# Patient Record
Sex: FEMALE | Race: WHITE | NOT HISPANIC OR LATINO | Employment: UNEMPLOYED | URBAN - NONMETROPOLITAN AREA
[De-identification: names, ages, dates, MRNs, and addresses within clinical notes are randomized per-mention and may not be internally consistent; named-entity substitution may affect disease eponyms.]

---

## 2023-07-22 ENCOUNTER — APPOINTMENT (EMERGENCY)
Dept: RADIOLOGY | Facility: HOSPITAL | Age: 38
End: 2023-07-22
Payer: COMMERCIAL

## 2023-07-22 ENCOUNTER — HOSPITAL ENCOUNTER (EMERGENCY)
Facility: HOSPITAL | Age: 38
Discharge: HOME | End: 2023-07-22
Attending: EMERGENCY MEDICINE
Payer: COMMERCIAL

## 2023-07-22 VITALS
DIASTOLIC BLOOD PRESSURE: 98 MMHG | SYSTOLIC BLOOD PRESSURE: 156 MMHG | WEIGHT: 229.4 LBS | TEMPERATURE: 97.4 F | OXYGEN SATURATION: 98 % | HEART RATE: 101 BPM | RESPIRATION RATE: 16 BRPM

## 2023-07-22 DIAGNOSIS — M25.511 ACUTE PAIN OF RIGHT SHOULDER: Primary | ICD-10-CM

## 2023-07-22 DIAGNOSIS — M77.8 TENDINITIS OF SHOULDER, RIGHT: ICD-10-CM

## 2023-07-22 PROCEDURE — 99283 EMERGENCY DEPT VISIT LOW MDM: CPT

## 2023-07-22 PROCEDURE — 99284 EMERGENCY DEPT VISIT MOD MDM: CPT | Performed by: EMERGENCY MEDICINE

## 2023-07-22 PROCEDURE — 73030 X-RAY EXAM OF SHOULDER: CPT

## 2023-07-22 RX ORDER — PREDNISONE 20 MG/1
40 TABLET ORAL DAILY
Qty: 10 TABLET | Refills: 0 | Status: SHIPPED | OUTPATIENT
Start: 2023-07-22 | End: 2023-07-27

## 2023-07-22 RX ORDER — NAPROXEN 500 MG/1
500 TABLET ORAL 2 TIMES DAILY WITH MEALS
Qty: 30 TABLET | Refills: 0 | Status: SHIPPED | OUTPATIENT
Start: 2023-07-22

## 2023-07-22 NOTE — ED PROVIDER NOTES
History  Chief Complaint   Patient presents with   • Arm Pain     Left arm pain been going on for a week now, did not call PCP because he is up in [place]. Took ibuprofen and helps     Patient complains of ongoing right shoulder pain for the past 1 week. Ibuprofen with relief. Worse with movement. No known trauma. No fevers or chills. No neck pain. No weakness or numbness. History provided by:  Patient   used: No    Arm Pain  Location:  Right shoulder pain  Quality:  Achy  Severity:  Mild  Onset quality:  Gradual  Duration:  1 week  Timing:  Constant  Progression:  Unchanged  Chronicity:  New  Context:  Atraumatic right shoulder pain  Relieved by:  Ibuprofen  Worsened by: Movement  Ineffective treatments:  None tried  Associated symptoms: no abdominal pain, no chest pain, no congestion, no cough, no diarrhea, no ear pain, no fever, no headaches, no loss of consciousness, no nausea, no rash, no rhinorrhea, no shortness of breath, no sore throat, no vomiting and no wheezing        None       Past Medical History:   Diagnosis Date   • Depression    • GERD (gastroesophageal reflux disease)    • Migraines    • Paranoia ([address])    • PTSD (post-traumatic stress disorder)        Past Surgical History:   Procedure Laterality Date   • CHOLECYSTECTOMY     • DENTAL SURGERY         History reviewed. No pertinent family history. I have reviewed and agree with the history as documented. E-Cigarette/Vaping   • E-Cigarette Use Never User      E-Cigarette/Vaping Substances     Social History     Tobacco Use   • Smoking status: Never   • Smokeless tobacco: Never   Vaping Use   • Vaping Use: Never used   Substance Use Topics   • Alcohol use: Yes     Comment: ocassional   • Drug use: Yes       Review of Systems   Constitutional: Negative for chills and fever. HENT: Negative for congestion, ear pain, hearing loss, rhinorrhea, sore throat, trouble swallowing and voice change.     Eyes: Negative for pain and discharge. Respiratory: Negative for cough, shortness of breath and wheezing. Cardiovascular: Negative for chest pain and palpitations. Gastrointestinal: Negative for abdominal pain, blood in stool, constipation, diarrhea, nausea and vomiting. Genitourinary: Negative for dysuria, flank pain, frequency and hematuria. Musculoskeletal: Positive for arthralgias. Negative for joint swelling, neck pain and neck stiffness. Skin: Negative for rash and wound. Neurological: Negative for dizziness, seizures, loss of consciousness, syncope, facial asymmetry and headaches. Psychiatric/Behavioral: Negative for hallucinations, self-injury and suicidal ideas. All other systems reviewed and are negative. Physical Exam  Physical Exam  Constitutional:       General: She is not in acute distress. Appearance: Normal appearance. She is not ill-appearing. HENT:      Head: Normocephalic and atraumatic. Right Ear: External ear normal.      Left Ear: External ear normal.      Nose: Nose normal.      Mouth/Throat:      Mouth: Mucous membranes are moist.   Eyes:      Extraocular Movements: Extraocular movements intact. Pupils: Pupils are equal, round, and reactive to light. Cardiovascular:      Rate and Rhythm: Normal rate and regular rhythm. Pulmonary:      Effort: Pulmonary effort is normal. No respiratory distress. Breath sounds: Normal breath sounds. Abdominal:      General: Abdomen is flat. Bowel sounds are normal. There is no distension. Palpations: Abdomen is soft. Tenderness: There is no abdominal tenderness. Musculoskeletal:         General: No swelling or tenderness. Cervical back: Normal range of motion and neck supple. Comments: Right shoulder with full range of motion. No obvious bony deformity. Diffusely tender in the shoulder. Radial pulses 2+. Neurovascular tact distally. Skin:     General: Skin is warm and dry.       Capillary Refill: Capillary refill takes less than 2 seconds. Neurological:      General: No focal deficit present. Mental Status: She is alert and oriented to person, place, and time. Psychiatric:         Mood and Affect: Mood normal.         Behavior: Behavior normal.         Vital Signs  ED Triage Vitals [07/22/23 1015]   Temperature Pulse Respirations Blood Pressure SpO2   (!) 97.4 °F (36.3 °C) 101 16 156/98 98 %      Temp Source Heart Rate Source Patient Position - Orthostatic VS BP Location FiO2 (%)   Temporal Monitor Sitting Left arm --      Pain Score       7           Vitals:    07/22/23 1015   BP: 156/98   Pulse: 101   Patient Position - Orthostatic VS: Sitting         Visual Acuity      ED Medications  Medications - No data to display    Diagnostic Studies  Results Reviewed     None                 XR shoulder 2+ views RIGHT   ED Interpretation by Mil Miller MD (07/22 1039)   No fracture or dislocation. Procedures  Procedures         ED Course                               SBIRT 22yo+    Flowsheet Row Most Recent Value   Initial Alcohol Screen: US AUDIT-C     1. How often do you have a drink containing alcohol? 0 Filed at: 07/22/2023 1019   2. How many drinks containing alcohol do you have on a typical day you are drinking? 0 Filed at: 07/22/2023 1019   3b. FEMALE Any Age, or MALE 65+: How often do you have 4 or more drinks on one occassion? 0 Filed at: 07/22/2023 1019   Audit-C Score 0 Filed at: 07/22/2023 1019   CATY: How many times in the past year have you. .. Used an illegal drug or used a prescription medication for non-medical reasons? Never Filed at: 07/22/2023 1019                    Medical Decision Making  Amount and/or Complexity of Data Reviewed  Radiology: ordered and independent interpretation performed. Decision-making details documented in ED Course.   Discussion of management or test interpretation with external provider(s): Differential diagnosis includes but not limited to shoulder strain, tendinitis, bursitis, AC strain, rotator cuff injury, fracture, dislocation. Disposition  Final diagnoses:   Acute pain of right shoulder   Tendinitis of shoulder, right     Time reflects when diagnosis was documented in both MDM as applicable and the Disposition within this note     Time User Action Codes Description Comment    7/22/2023 10:40 AM Yi Mcnamara Add [W92.097] Acute pain of right shoulder     7/22/2023 10:40 AM La Iglesias Monday Add [M77.8] Tendinitis of shoulder, right       ED Disposition     ED Disposition   Discharge    Condition   Stable    Date/Time   Sat Jul 22, 2023 10:40 AM    Comment   Cuca Tracy discharge to home/self care. Follow-up Information     Follow up With Specialties Details Why Contact Info    Haydee Wright MD Family Medicine Call in 2 days  [facility]  [phone]      Nilson Cm MD Sports Medicine Call in 2 days  [address] [place] [address]  [phone]            Patient's Medications   Discharge Prescriptions    NAPROXEN (NAPROSYN) 500 MG TABLET    Take 1 tablet (500 mg total) by mouth 2 (two) times a day with meals       Start Date: 7/22/2023 End Date: --       Order Dose: 500 mg       Quantity: 30 tablet    Refills: 0    PREDNISONE 20 MG TABLET    Take 2 tablets (40 mg total) by mouth daily for 5 days       Start Date: 7/22/2023 End Date: 7/27/2023       Order Dose: 40 mg       Quantity: 10 tablet    Refills: 0       No discharge procedures on file.     PDMP Review     None          ED Provider  Electronically Signed by           Yi Mcnamara MD  07/22/23 0009

## 2024-03-18 ENCOUNTER — TRANSCRIBE ORDERS (OUTPATIENT)
Dept: CARDIOLOGY CLINIC | Facility: CLINIC | Age: 39
End: 2024-03-18

## 2024-03-18 DIAGNOSIS — R31.21 ASYMPTOMATIC MICROSCOPIC HEMATURIA: Primary | ICD-10-CM

## 2024-04-15 PROBLEM — R31.29 MICROHEMATURIA: Status: ACTIVE | Noted: 2024-04-15

## 2024-04-15 NOTE — PROGRESS NOTES
UROLOGY PROGRESS NOTE         NAME: Cele Adrian  AGE: 39 y.o. SEX: female  : 1985   MRN: [id number]    DATE: 2024  TIME: 10:15 AM    Assessment and Plan     Bladder catheterization    Date/Time: 2024 10:00 AM    Performed by: Hari Allison MD  Authorized by: Hari Allison MD    Comments:      Patient was placed in dorsolithotomy position and prepped and draped in usual sterile fashion.  Pelvic exam revealed no prolapse.  She demonstrated average ability for Kegel's.    She was then prepped and draped in a straight cath with a 12 Vietnamese catheter for post residual of an ounce grossly clear.  Patient tolerated well       Impression:   1. Microhematuria    2. Asymptomatic microscopic hematuria  -     Ambulatory referral to Urology    3. Urinary frequency    4. Urge incontinence    5. Female stress incontinence         Plan: For the microhematuria workup we will set the patient up for a CT stone protocol, and a office cystoscopy.  Will also set up for a culture and cytology.    Frequency urgency and urge incontinence we can give her a trial of Gemtesa 75 mg and we will see her back in about 4 weeks to check her response, reviewed the CT and perform the cystoscopy.  Patient understands and agrees.      Chief Complaint     Chief Complaint   Patient presents with    Microhematuria    Difficulty Urinating     Painful urination     Nocturia     History of Present Illness     HPI: Cele Adrian is a 39 y.o. year old female who presents with evaluation of asymptomatic microhematuria.  Was from a urinalysis on 3/14/2024.  Nitrites were negative no white cells but 3-5 red cells.  There was bacteria in the urine urine culture was no growth.    Patient states he has been having daytime frequency sometimes every 1/2 hour.  Nocturia x 2 sometimes pain with voiding but no dysuria.  Patient states no gross hematuria.  Occasionally has some constipation with this pain with defecation.  She has not had  "problems with recurrent UTIs.  Does not smoke or drink she has a good flow.  She also has some mild stress incontinence as well as urge urge incontinence.          The following portions of the patient's history were reviewed and updated as appropriate: allergies, current medications, past family history, past medical history, past social history, past surgical history and problem list.  Past Medical History:   Diagnosis Date    Anxiety and depression     Asthma     Bipolar disorder, unspecified (HCC)     Cerebral palsy (HCC)     Dyslipidemia     Elevated alkaline phosphatase level     GERD (gastroesophageal reflux disease)     Major depressive disorder     Migraines     Obesity without serious comorbidity     Other allergic rhinitis     Paranoia (HCC)     Prediabetes     PTSD (post-traumatic stress disorder)     Social phobia     Thrombocytosis      Past Surgical History:   Procedure Laterality Date    CHOLECYSTECTOMY      DENTAL SURGERY       shoulder  Review of Systems     Const: Denies chills, fever and weight loss.  CV: Denies chest pain.  Resp: Denies SOB.  GI: Denies abdominal pain, nausea and vomiting.  : Denies symptoms other than stated above.  Musculo: Denies back pain.    Objective   /90 (BP Location: Left arm, Patient Position: Sitting, Cuff Size: Adult)   Temp 98.1 °F (36.7 °C)   Ht 5' 1\" (1.549 m)   Wt 104 kg (229 lb 6.4 oz)   SpO2 99%   BMI 43.34 kg/m²     Physical Exam  Const: Appears healthy and well developed. No signs of acute distress present.  Resp: Respirations are regular and unlabored.   CV: Rate is regular. Rhythm is regular.  Abdomen: Abdomen is soft, nontender, and nondistended. Kidneys are not palpable.  : No prolapse.  Psych: Patient's attitude is cooperative. Mood is normal. Affect is normal.    Current Medications     Current Outpatient Medications:     Acetaminophen 500 MG, Take 500 mg by mouth every 6 (six) hours as needed, Disp: , Rfl:     ARIPiprazole (ABILIFY) 5 mg " tablet, Take 5 mg by mouth daily, Disp: , Rfl:     busPIRone (BUSPAR) 5 mg tablet, Take 1 tablet by mouth twice daily for anxiety., Disp: , Rfl:     cetirizine (ZyrTEC) 10 mg tablet, Take 1 tablet by mouth daily as needed, Disp: , Rfl:     clotrimazole-betamethasone (LOTRISONE) 1-0.05 % cream, Apply topically 2 (two) times a day, Disp: , Rfl:     escitalopram (LEXAPRO) 10 mg tablet, Take 1 tablet by mouth daily, Disp: , Rfl:     fluticasone (FLONASE) 50 mcg/act nasal spray, USE TWO (2) SPRAYS IN EACH NOSTRIL ONCE DAILY, Disp: , Rfl:     hydrOXYzine HCL (ATARAX) 25 mg tablet, Take 1 tablet by mouth 2 (two) times a day as needed, Disp: , Rfl:     ibuprofen (MOTRIN) 400 mg tablet, Take 400 mg by mouth every 4 (four) hours as needed, Disp: , Rfl:     melatonin 3 mg, Take 5 mg by mouth, Disp: , Rfl:     montelukast (SINGULAIR) 10 mg tablet, Take 1 tablet by mouth daily, Disp: , Rfl:     naproxen (Naprosyn) 500 mg tablet, Take 1 tablet (500 mg total) by mouth 2 (two) times a day with meals, Disp: 30 tablet, Rfl: 0    norethindrone-ethinyl estradiol (FEMHRT 1/5) 1-5 MG-MCG TABS, Take 1 tablet by mouth daily, Disp: , Rfl:     omeprazole (PriLOSEC) 40 MG capsule, Take 40 mg by mouth daily, Disp: , Rfl:     perphenazine 4 mg tablet, Take 1 tabletby mouth twice a day for 2 weeks stop perphenazine 2mg, Disp: , Rfl:     rosuvastatin (CRESTOR) 20 MG tablet, Take 1 tablet by mouth every morning, Disp: , Rfl:     semaglutide, 0.25 or 0.5 mg/dose, (Ozempic, 0.25 or 0.5 MG/DOSE,) 2 mg/3 mL injection pen, Inject 0.5 mg under the skin, Disp: , Rfl:     topiramate (TOPAMAX) 50 MG tablet, Take 1 tablet by mouth 2 (two) times a day, Disp: , Rfl:     venlafaxine (EFFEXOR) 75 mg tablet, Take 75 mg by mouth daily, Disp: , Rfl:         Hari Allison MD

## 2024-04-17 RX ORDER — HYDROXYZINE HYDROCHLORIDE 25 MG/1
1 TABLET, FILM COATED ORAL 2 TIMES DAILY PRN
COMMUNITY
Start: 2024-02-28

## 2024-04-17 RX ORDER — CETIRIZINE HYDROCHLORIDE 10 MG/1
1 TABLET ORAL DAILY PRN
COMMUNITY
Start: 2024-02-19 | End: 2025-02-18

## 2024-04-17 RX ORDER — OMEPRAZOLE 40 MG/1
40 CAPSULE, DELAYED RELEASE ORAL DAILY
COMMUNITY

## 2024-04-17 RX ORDER — IBUPROFEN 400 MG/1
400 TABLET ORAL EVERY 4 HOURS PRN
COMMUNITY
Start: 2024-02-22 | End: 2024-05-22

## 2024-04-17 RX ORDER — ESCITALOPRAM OXALATE 10 MG/1
1 TABLET ORAL DAILY
COMMUNITY
Start: 2024-02-28

## 2024-04-17 RX ORDER — PERPHENAZINE 4 MG/1
TABLET ORAL
COMMUNITY
Start: 2024-01-30

## 2024-04-17 RX ORDER — CLOTRIMAZOLE AND BETAMETHASONE DIPROPIONATE 10; .64 MG/G; MG/G
CREAM TOPICAL 2 TIMES DAILY
COMMUNITY
Start: 2023-05-18 | End: 2024-05-17

## 2024-04-17 RX ORDER — FLUTICASONE PROPIONATE 50 MCG
SPRAY, SUSPENSION (ML) NASAL
COMMUNITY
Start: 2024-02-19

## 2024-04-17 RX ORDER — BUSPIRONE HYDROCHLORIDE 5 MG/1
TABLET ORAL
COMMUNITY
Start: 2024-02-28

## 2024-04-17 RX ORDER — MONTELUKAST SODIUM 10 MG/1
1 TABLET ORAL DAILY
COMMUNITY
Start: 2023-06-09 | End: 2024-06-17

## 2024-04-17 RX ORDER — TOPIRAMATE 50 MG/1
1 TABLET, FILM COATED ORAL 2 TIMES DAILY
COMMUNITY
Start: 2024-02-08

## 2024-04-17 RX ORDER — ROSUVASTATIN CALCIUM 20 MG/1
1 TABLET, COATED ORAL EVERY MORNING
COMMUNITY
Start: 2023-06-09 | End: 2024-06-17

## 2024-04-17 RX ORDER — VENLAFAXINE 75 MG/1
75 TABLET ORAL DAILY
COMMUNITY

## 2024-04-17 RX ORDER — ARIPIPRAZOLE 5 MG/1
5 TABLET ORAL DAILY
COMMUNITY
Start: 2024-02-28

## 2024-04-17 RX ORDER — LANOLIN ALCOHOL/MO/W.PET/CERES
5 CREAM (GRAM) TOPICAL
COMMUNITY

## 2024-04-25 ENCOUNTER — OFFICE VISIT (OUTPATIENT)
Dept: UROLOGY | Facility: CLINIC | Age: 39
End: 2024-04-25
Payer: COMMERCIAL

## 2024-04-25 VITALS
HEIGHT: 61 IN | SYSTOLIC BLOOD PRESSURE: 136 MMHG | OXYGEN SATURATION: 99 % | HEART RATE: 83 BPM | TEMPERATURE: 98.1 F | BODY MASS INDEX: 43.31 KG/M2 | DIASTOLIC BLOOD PRESSURE: 90 MMHG | WEIGHT: 229.4 LBS

## 2024-04-25 DIAGNOSIS — R31.29 MICROHEMATURIA: Primary | ICD-10-CM

## 2024-04-25 DIAGNOSIS — R35.0 URINARY FREQUENCY: ICD-10-CM

## 2024-04-25 DIAGNOSIS — N39.41 URGE INCONTINENCE: ICD-10-CM

## 2024-04-25 DIAGNOSIS — R31.21 ASYMPTOMATIC MICROSCOPIC HEMATURIA: ICD-10-CM

## 2024-04-25 DIAGNOSIS — N39.3 FEMALE STRESS INCONTINENCE: ICD-10-CM

## 2024-04-25 LAB
SL AMB  POCT GLUCOSE, UA: ABNORMAL
SL AMB LEUKOCYTE ESTERASE,UA: ABNORMAL
SL AMB POCT BILIRUBIN,UA: ABNORMAL
SL AMB POCT BLOOD,UA: ABNORMAL
SL AMB POCT CLARITY,UA: CLEAR
SL AMB POCT COLOR,UA: YELLOW
SL AMB POCT KETONES,UA: ABNORMAL
SL AMB POCT NITRITE,UA: ABNORMAL
SL AMB POCT PH,UA: 5.5
SL AMB POCT SPECIFIC GRAVITY,UA: 1.01
SL AMB POCT URINE PROTEIN: ABNORMAL
SL AMB POCT UROBILINOGEN: 0.2

## 2024-04-25 PROCEDURE — 88112 CYTOPATH CELL ENHANCE TECH: CPT | Performed by: PATHOLOGY

## 2024-04-25 PROCEDURE — 81003 URINALYSIS AUTO W/O SCOPE: CPT | Performed by: UROLOGY

## 2024-04-25 PROCEDURE — 87086 URINE CULTURE/COLONY COUNT: CPT | Performed by: UROLOGY

## 2024-04-25 PROCEDURE — 99204 OFFICE O/P NEW MOD 45 MIN: CPT | Performed by: UROLOGY

## 2024-04-25 RX ORDER — NORETHINDRONE ACETATE AND ETHINYL ESTRADIOL 1; 5 MG/1; UG/1
1 TABLET ORAL DAILY
COMMUNITY
Start: 2024-04-17

## 2024-04-26 ENCOUNTER — TELEPHONE (OUTPATIENT)
Age: 39
End: 2024-04-26

## 2024-04-26 LAB — BACTERIA UR CULT: NORMAL

## 2024-04-26 NOTE — TELEPHONE ENCOUNTER
Spoke with patient and she said that the Vibegron is not covered by her insurance. She said that they were suppose to have called the office. Made patient aware that we have not heard from them. She is going to call the insurance company and discuss what other medications are covered and will call the office back.

## 2024-04-26 NOTE — TELEPHONE ENCOUNTER
Pt called in and requested a call back from one of the nurses regarding the medication and insurance issues.     Pt call back: [phone]

## 2024-04-26 NOTE — TELEPHONE ENCOUNTER
Pt called and requesting call back from clinical staff regarding questions about current medications     Pt call back-[phone]

## 2024-04-29 PROCEDURE — 88112 CYTOPATH CELL ENHANCE TECH: CPT | Performed by: PATHOLOGY

## 2024-04-29 NOTE — TELEPHONE ENCOUNTER
Patient calling for an update on medications covered by her insurance.    She stated she tried asking her insurance but they told her they would just call the office.    Patient requesting a call back at [phone]

## 2024-05-01 ENCOUNTER — TELEPHONE (OUTPATIENT)
Dept: UROLOGY | Facility: CLINIC | Age: 39
End: 2024-05-01

## 2024-05-01 NOTE — TELEPHONE ENCOUNTER
Spoke with pt, she spoke with her insurance company and they have myrbetriq as a formulary drug. Did encourage pt to call different pharmacies and do a price check. She will call us back if she wants to try this drug, and which pharmacy to send it to.  Nothing else needed at this time.  Pt called back and asked for myrbetriq to be called into [facility] pharmacy as listed in her chart, it will cost 3$.

## 2024-05-01 NOTE — TELEPHONE ENCOUNTER
Spoke with pt, she cannot afford the vibergron, and her insurance does not cover it.  Educated on ditropan, she will call her insurance and see if that is covered. If so, she will call us back for a prescription.  Nothing else needed at this time.

## 2024-05-01 NOTE — TELEPHONE ENCOUNTER
Patient calling in stating she spoke with her insurance company and has more info for our office. Requesting call back from nursing staff.       CB: [phone]

## 2024-05-06 ENCOUNTER — HOSPITAL ENCOUNTER (OUTPATIENT)
Dept: CT IMAGING | Facility: HOSPITAL | Age: 39
Discharge: HOME | End: 2024-05-06
Attending: UROLOGY
Payer: COMMERCIAL

## 2024-05-06 DIAGNOSIS — R31.29 MICROHEMATURIA: ICD-10-CM

## 2024-05-06 PROCEDURE — 74176 CT ABD & PELVIS W/O CONTRAST: CPT

## 2024-05-15 DIAGNOSIS — R35.0 URINARY FREQUENCY: Primary | ICD-10-CM

## 2024-05-21 ENCOUNTER — TELEPHONE (OUTPATIENT)
Age: 39
End: 2024-05-21

## 2024-05-21 NOTE — TELEPHONE ENCOUNTER
Pt calling in regards to results of CT scan. Pt is requesting a call back from clinical to review the results of the CT scan.    Pt call back- [phone]

## 2024-05-28 PROBLEM — N83.202 CYST OF LEFT OVARY: Status: ACTIVE | Noted: 2024-05-28

## 2024-05-30 ENCOUNTER — OFFICE VISIT (OUTPATIENT)
Dept: PHYSICAL THERAPY | Facility: CLINIC | Age: 39
End: 2024-05-30
Payer: COMMERCIAL

## 2024-05-30 DIAGNOSIS — M75.01 ADHESIVE BURSITIS OF RIGHT SHOULDER: Primary | ICD-10-CM

## 2024-05-30 DIAGNOSIS — Z47.89 SURGICAL AFTERCARE, MUSCULOSKELETAL SYSTEM: ICD-10-CM

## 2024-05-30 PROCEDURE — 97140 MANUAL THERAPY 1/> REGIONS: CPT | Performed by: PHYSICAL THERAPIST

## 2024-05-30 PROCEDURE — 97161 PT EVAL LOW COMPLEX 20 MIN: CPT | Performed by: PHYSICAL THERAPIST

## 2024-05-30 PROCEDURE — 97110 THERAPEUTIC EXERCISES: CPT | Performed by: PHYSICAL THERAPIST

## 2024-05-30 NOTE — PROGRESS NOTES
PT Evaluation     Today's date: 2024  Patient name: Cele Adrian  : 1985  MRN: [id number]  Referring provider: Sebas Bains MD  Dx:   Encounter Diagnosis     ICD-10-CM    1. Adhesive bursitis of right shoulder  M75.01       2. Surgical aftercare, musculoskeletal system  Z47.89           Start Time: 0900  Stop Time: 0955  Total time in clinic (min): 55 minutes    Assessment  Impairments: abnormal or restricted ROM, impaired physical strength, lacks appropriate home exercise program, pain with function and poor posture     Assessment details: Patient is 39 y.o. female who presents to PT with a 10 month hx of right shoulder pain and limited motion. She underwent an arthroscopy yesterday with Dr. Mcrae for capsular release and SAD. Patient demonstrates limited AROM of the shoulder in all motions. She is does demonstrate muscular guard with all PROM/ AAROM this date. She was educated with a HEP to be completed 3 x daily including pendulums/ table slides. Patient is scheduled to be treated daily for the next week but is unable to attend treatment daily the week of  due to other appointments.   Understanding of Dx/Px/POC: good     Prognosis: good    Goals  STG - 4 weeks  Patient to report decreased pain to 5/10 with function of the R shoulder.  Patient able to complete self dressing tasks of the upper body without assistance.  Patient able to complete hygiene tasks without assistance.  Patient to have increased R shoulder AROM to 120 into flexion.    LTG - 12 weeks  Patient able to demonstrate strength of the R shoulder into flexion/ abduction to 4/5.  Patient to demonstrate increased strength of the R shoulder into ER to 4/5.  Patient able to demonstrate R shoulder AROM to WNL for completion of functional tasks.  Patient to note pain at worst with activity to be 2/10 SPR.  Patient to demonstrate posture in sitting and standing WNL.  Patient to be d/c to final/ revised HEP.      Plan  Patient would benefit from: PT eval  Planned modality interventions: cryotherapy, electrical stimulation/Russian stimulation, TENS and thermotherapy: hydrocollator packs    Planned therapy interventions: manual therapy, neuromuscular re-education, therapeutic activities, therapeutic exercise, strengthening, postural training, home exercise program and functional ROM exercises    Frequency: 5x weekly x2 weeks/ 3x weekly x4 weeks.  Duration in weeks: 6  Plan of Care beginning date: 2024  Plan of Care expiration date: 2024  Treatment plan discussed with: patient  Plan details: Patient's evaluation is completed. Patient was instructed with a HEP this date. Patient has scheduled further PT sessions for treatment.          Subjective Evaluation    History of Present Illness  Mechanism of injury: Patient notes onset of pain 2023 - she had increasing difficulty with AROM. She did have an injection in the shoulder that was not helpful. She had surgery yesterday with Dr. Mcrae at [facility]. She notes difficulty sleeping last night in a reclining chair. She is right hand dominant. She normally enjoys walking her dog but is unable to to do so.   Patient Goals  Patient goals for therapy: decreased pain, increased strength, independence with ADLs/IADLs and increased motion    Pain  Current pain ratin  At best pain ratin  At worst pain ratin  Quality: throbbing and dull ache  Relieving factors: ice    Social Support  Lives in: multiple-level home    Employment status: working (babysit/ dog sit)  Hand dominance: right    Treatments  Previous treatment: injection treatment      Objective     Passive Range of Motion   Left Shoulder   Normal passive range of motion    Right Shoulder   Flexion: 20 degrees   Extension: 26 degrees   Abduction: 24 degrees   External rotation 45°: 0 degrees   Internal rotation 45°: 32 degrees     Strength/Myotome Testing     Left Shoulder   Normal muscle  "strength    Right Shoulder     Planes of Motion   Flexion: 2-   Extension: 3-   Abduction: 2-   Adduction: 2   External rotation at 0°: 2-   Internal rotation at 0°: 2-     General Comments:      Shoulder Comments   Patient is very guarded with all PROM/AAROM tasks. With verbal and tactile cues she did improve.             Precautions: Asthma     Daily Treatment Diary:      Initial Evaluation Date: 05/30/24  Compliance 5/30                     Visit Number 1                    Re-Eval  IE                 MC   Foto Captured Y                           5/30                     Manual                      Shld PROM/ stretch 20 min                     GH traction/ caspular mob -->                                           Ther-Ex                      Pendulums- CW/CCW/Flex/ Ext x10            Pulleys -->                     Ball roll on table -->                     Finger ladder -->                     Seated table slides- flex/ abd  X10 5\" hold                     RC iso                      Digi  X10                      Wrist flex/ext x10                     Sup / pronation x10                     Bicep curl x10                                           Neuro Re-Ed                      No money                      Lat pulldown             Lat row                                       Ther-Act                                                               Modalities                      CP                                                     Access Code: [email or web address]  URL: [email or web address]  Date: 05/30/2024  Prepared by: Beryl Jackson    Exercises  - Circular Shoulder Pendulum with Table Support  - 3 x daily - 7 x weekly - 2 sets - 10 reps  - Flexion-Extension Shoulder Pendulum with Table Support  - 3 x daily - 7 x weekly - 2 sets - 10 reps  - Seated Shoulder Flexion Towel Slide at Table Top  - 3 x daily - 7 x weekly - 2 sets - 10 reps  - Seated Shoulder Abduction Towel Slide at Table Top  - 3 " x daily - 7 x weekly - 2 sets - 10 reps  - Seated Gripping Towel  - 3 x daily - 7 x weekly - 1 sets - 10 reps  - Wrist AROM Flexion Extension  - 3 x daily - 7 x weekly - 1 sets - 10 reps  - Seated Forearm Pronation and Supination AROM  - 3 x daily - 7 x weekly - 1 sets - 10 reps  - Seated Elbow Flexion and Extension AROM  - 3 x daily - 7 x weekly - 1 sets - 10 reps

## 2024-05-30 NOTE — LETTER
May 30, 2024    Sebas Bains MD  [address] [facility] [address]    Patient: Cele Adrian   YOB: 1985   Date of Visit: 2024     Encounter Diagnosis     ICD-10-CM    1. Adhesive bursitis of right shoulder  M75.01       2. Surgical aftercare, musculoskeletal system  Z47.89           Dear Dr. Bains:    Thank you for your recent referral of Cele Adrian. Please review the attached evaluation summary from Cele's recent visit.     Please verify that you agree with the plan of care by signing the attached order.     If you have any questions or concerns, please do not hesitate to call.     I sincerely appreciate the opportunity to share in the care of one of your patients and hope to have another opportunity to work with you in the near future.       Sincerely,    Beryl Jackson, PT      Referring Provider:      I certify that I have read the below Plan of Care and certify the need for these services furnished under this plan of treatment while under my care.                    Sebas Bains MD  [address] [facility] [address]  Via Fax: [phone]          PT Evaluation     Today's date: 2024  Patient name: Cele Adrian  : 1985  MRN: [id number]  Referring provider: No ref. provider found  Dx:   Encounter Diagnosis     ICD-10-CM    1. Adhesive bursitis of right shoulder  M75.01       2. Surgical aftercare, musculoskeletal system  Z47.89                      Assessment  Impairments: abnormal or restricted ROM, impaired physical strength, lacks appropriate home exercise program, pain with function and poor posture     Assessment details: Patient is 39 y.o. female who presents to PT with a 10 month hx of right shoulder pain and limited motion. She underwent an arthroscopy yesterday with Dr. Mcrae for capsular release and SAD. Patient demonstrates limited AROM of the shoulder in all motions. She is does demonstrate muscular guard with all PROM/ AAROM  this date. She was educated with a HEP to be completed 3 x daily including pendulums/ table slides. Patient is scheduled to be treated daily for the next week but is unable to attend treatment daily the week of June 10th due to other appointments.   Understanding of Dx/Px/POC: good     Prognosis: good    Goals  STG - 4 weeks  Patient to report decreased pain to 5/10 with function of the R shoulder.  Patient able to complete self dressing tasks of the upper body without assistance.  Patient able to complete hygiene tasks without assistance.  Patient to have increased R shoulder AROM to 120 into flexion.    LTG - 12 weeks  Patient able to demonstrate strength of the R shoulder into flexion/ abduction to 4/5.  Patient to demonstrate increased strength of the R shoulder into ER to 4/5.  Patient able to demonstrate R shoulder AROM to WNL for completion of functional tasks.  Patient to note pain at worst with activity to be 2/10 SPR.  Patient to demonstrate posture in sitting and standing WNL.  Patient to be d/c to final/ revised HEP.     Plan  Patient would benefit from: PT eval  Planned modality interventions: cryotherapy, electrical stimulation/Russian stimulation, TENS and thermotherapy: hydrocollator packs    Planned therapy interventions: manual therapy, neuromuscular re-education, therapeutic activities, therapeutic exercise, strengthening, postural training, home exercise program and functional ROM exercises    Frequency: 5x weekly x2 weeks/ 3x weekly x4 weeks.  Duration in weeks: 6  Plan of Care beginning date: 5/30/2024  Plan of Care expiration date: 7/11/2024  Treatment plan discussed with: patient  Plan details: Patient's evaluation is completed. Patient was instructed with a HEP this date. Patient has scheduled further PT sessions for treatment.          Subjective Evaluation    History of Present Illness  Mechanism of injury: Patient notes onset of pain July 2023 - she had increasing difficulty with AROM.  She did have an injection in the shoulder that was not helpful. She had surgery yesterday with Dr. Mcrae at [facility]. She notes difficulty sleeping last night in a reclining chair. She is right hand dominant. She normally enjoys walking her dog but is unable to to do so.   Patient Goals  Patient goals for therapy: decreased pain, increased strength, independence with ADLs/IADLs and increased motion    Pain  Current pain ratin  At best pain ratin  At worst pain ratin  Quality: throbbing and dull ache  Relieving factors: ice    Social Support  Lives in: multiple-level home    Employment status: working (babysit/ dog sit)  Hand dominance: right    Treatments  Previous treatment: injection treatment      Objective     Passive Range of Motion   Left Shoulder   Normal passive range of motion    Right Shoulder   Flexion: 20 degrees   Extension: 26 degrees   Abduction: 24 degrees   External rotation 45°: 0 degrees   Internal rotation 45°: 32 degrees     Strength/Myotome Testing     Left Shoulder   Normal muscle strength    Right Shoulder     Planes of Motion   Flexion: 2-   Extension: 3-   Abduction: 2-   Adduction: 2   External rotation at 0°: 2-   Internal rotation at 0°: 2-     General Comments:      Shoulder Comments   Patient is very guarded with all PROM/AAROM tasks. With verbal and tactile cues she did improve.             Precautions: Asthma     Daily Treatment Diary:      Initial Evaluation Date: 24  Compliance                      Visit Number 1                    Re-Eval  IE                 MC   Foto Captured Y                                                Manual                      Shld PROM/ stretch 20 min                     GH traction/ caspular mob -->                                           Ther-Ex                      Pendulums- CW/CCW/Flex/ Ext x10            Pulleys -->                     Ball roll on table -->                     Finger ladder -->                "      Seated table slides- flex/ abd  X10 5\" hold                     RC iso                      Digi  X10                      Wrist flex/ext x10                     Sup / pronation x10                     Bicep curl x10                                           Neuro Re-Ed                      No money                      Lat pulldown             Lat row                                       Ther-Act                                                               Modalities                      CP                                                     Access Code: [email or web address]  URL: [email or web address]  Date: 05/30/2024  Prepared by: Beryl Jackson    Exercises  - Circular Shoulder Pendulum with Table Support  - 3 x daily - 7 x weekly - 2 sets - 10 reps  - Flexion-Extension Shoulder Pendulum with Table Support  - 3 x daily - 7 x weekly - 2 sets - 10 reps  - Seated Shoulder Flexion Towel Slide at Table Top  - 3 x daily - 7 x weekly - 2 sets - 10 reps  - Seated Shoulder Abduction Towel Slide at Table Top  - 3 x daily - 7 x weekly - 2 sets - 10 reps  - Seated Gripping Towel  - 3 x daily - 7 x weekly - 1 sets - 10 reps  - Wrist AROM Flexion Extension  - 3 x daily - 7 x weekly - 1 sets - 10 reps  - Seated Forearm Pronation and Supination AROM  - 3 x daily - 7 x weekly - 1 sets - 10 reps  - Seated Elbow Flexion and Extension AROM  - 3 x daily - 7 x weekly - 1 sets - 10 reps                            "

## 2024-05-31 ENCOUNTER — OFFICE VISIT (OUTPATIENT)
Dept: PHYSICAL THERAPY | Facility: CLINIC | Age: 39
End: 2024-05-31
Payer: COMMERCIAL

## 2024-05-31 DIAGNOSIS — Z47.89 SURGICAL AFTERCARE, MUSCULOSKELETAL SYSTEM: ICD-10-CM

## 2024-05-31 DIAGNOSIS — M75.01 ADHESIVE BURSITIS OF RIGHT SHOULDER: Primary | ICD-10-CM

## 2024-05-31 PROCEDURE — 97110 THERAPEUTIC EXERCISES: CPT | Performed by: PHYSICAL THERAPIST

## 2024-05-31 PROCEDURE — 97140 MANUAL THERAPY 1/> REGIONS: CPT | Performed by: PHYSICAL THERAPIST

## 2024-05-31 NOTE — PROGRESS NOTES
"Daily Note     Today's date: 2024  Patient name: Cele Adrian  : 1985  MRN: [id number]  Referring provider: No ref. provider found  Dx:   Encounter Diagnosis     ICD-10-CM    1. Adhesive bursitis of right shoulder  M75.01       2. Surgical aftercare, musculoskeletal system  Z47.89                      Subjective: Patient notes she was better able to sleep last night - SPR is 5/10 at this time.       Objective: See treatment diary below      Assessment: Tolerated treatment well. Patient would benefit from continued PT. [facility] to 90 degrees. Patient had great difficulty isolating rhomboids/ middle trap for scap retractions.       Plan: Continue per plan of care. Patient encouraged to wean from the sling. She was also encouraged to complete her HEP at least 3x daily.      Precautions: Asthma/ DOS 24     Daily Treatment Diary:      Initial Evaluation Date: 24  Compliance                    Visit Number 1 2                   Re-Eval  IE                 MC   Foto Captured Y                                              Manual                      Shld PROM/ stretch 20 min  20 min                   GH distraction/ caspular mob --> distraction 5\" x10                                         Ther-Ex                      Pendulums- CW/CCW/Flex/ Ext x10 Seated x15           Pulleys -->  -->                   Ball roll on table -->  p-ball CW/CCCW/ flex x20 ea                   Self assist hand held flex  x10           Wand flexion  x10           Finger ladder -->  -->                   Seated table slides- flex/ abd  X10 5\" hold  x10 5\" hold                   RC iso   -->                   Digi  X10   green x20                   Wrist flex/ext x10  x20                   Sup / pronation x10  x20                   Bicep curl x10  x20                                         Neuro Re-Ed                      No money               "        Lat pulldown             Lat row             Active scap retraction  X15 max cues                        Ther-Act                                                               Modalities                      CP  10 min seated                                                   Access Code: [email or web address]  URL: [email or web address]  Date: 05/30/2024  Prepared by: Beryl Jackson    Exercises  - Circular Shoulder Pendulum with Table Support  - 3 x daily - 7 x weekly - 2 sets - 10 reps  - Flexion-Extension Shoulder Pendulum with Table Support  - 3 x daily - 7 x weekly - 2 sets - 10 reps  - Seated Shoulder Flexion Towel Slide at Table Top  - 3 x daily - 7 x weekly - 2 sets - 10 reps  - Seated Shoulder Abduction Towel Slide at Table Top  - 3 x daily - 7 x weekly - 2 sets - 10 reps  - Seated Gripping Towel  - 3 x daily - 7 x weekly - 1 sets - 10 reps  - Wrist AROM Flexion Extension  - 3 x daily - 7 x weekly - 1 sets - 10 reps  - Seated Forearm Pronation and Supination AROM  - 3 x daily - 7 x weekly - 1 sets - 10 reps  - Seated Elbow Flexion and Extension AROM  - 3 x daily - 7 x weekly - 1 sets - 10 reps

## 2024-06-02 ENCOUNTER — HOSPITAL ENCOUNTER (EMERGENCY)
Facility: HOSPITAL | Age: 39
Discharge: HOME | End: 2024-06-02
Attending: STUDENT IN AN ORGANIZED HEALTH CARE EDUCATION/TRAINING PROGRAM
Payer: COMMERCIAL

## 2024-06-02 VITALS
HEART RATE: 90 BPM | OXYGEN SATURATION: 98 % | WEIGHT: 251.1 LBS | SYSTOLIC BLOOD PRESSURE: 141 MMHG | DIASTOLIC BLOOD PRESSURE: 83 MMHG | RESPIRATION RATE: 18 BRPM | BODY MASS INDEX: 47.45 KG/M2

## 2024-06-02 DIAGNOSIS — M79.661 PAIN AND SWELLING OF RIGHT LOWER LEG: Primary | ICD-10-CM

## 2024-06-02 DIAGNOSIS — M79.89 PAIN AND SWELLING OF RIGHT LOWER LEG: Primary | ICD-10-CM

## 2024-06-02 DIAGNOSIS — Z98.890 S/P SHOULDER SURGERY: ICD-10-CM

## 2024-06-02 PROCEDURE — 99284 EMERGENCY DEPT VISIT MOD MDM: CPT | Performed by: STUDENT IN AN ORGANIZED HEALTH CARE EDUCATION/TRAINING PROGRAM

## 2024-06-02 PROCEDURE — 96372 THER/PROPH/DIAG INJ SC/IM: CPT

## 2024-06-02 PROCEDURE — 99283 EMERGENCY DEPT VISIT LOW MDM: CPT

## 2024-06-02 RX ORDER — ENOXAPARIN SODIUM 100 MG/ML
1.5 INJECTION SUBCUTANEOUS ONCE
Status: COMPLETED | OUTPATIENT
Start: 2024-06-02 | End: 2024-06-02

## 2024-06-02 RX ADMIN — ENOXAPARIN SODIUM 170 MG: 100 INJECTION SUBCUTANEOUS at 19:03

## 2024-06-02 NOTE — ED PROVIDER NOTES
History  Chief Complaint   Patient presents with    Leg Pain     Pt had right shoulder surgery Wednesday and now having swelling and pain to right lower leg and foot.       History provided by:  Patient, medical records and friend    39-year-old female.  Status post right shoulder surgery this past Wednesday.  Presents with increased swelling/pain along the right lower leg. She states that she noticed the increased pain/swelling after waking up from a nap this PM. The patient denies shortness of breath, chest pain. The patient and her friend state that the right lower leg appears more edematous than normal. The patient denies fevers/chills. The patient adds that she takes OCPs.     Prior to Admission Medications   Prescriptions Last Dose Informant Patient Reported? Taking?   ARIPiprazole (ABILIFY) 5 mg tablet   Yes No   Sig: Take 5 mg by mouth daily   Acetaminophen 500 MG   Yes No   Sig: Take 500 mg by mouth every 6 (six) hours as needed   Mirabegron ER 50 MG TB24   No No   Sig: Take 1 tablet (50 mg total) by mouth in the morning   Vibegron 75 MG TABS   No No   Sig: Take 75 mg by mouth in the morning   busPIRone (BUSPAR) 5 mg tablet   Yes No   Sig: Take 1 tablet by mouth twice daily for anxiety.   cetirizine (ZyrTEC) 10 mg tablet   Yes No   Sig: Take 1 tablet by mouth daily as needed   clotrimazole-betamethasone (LOTRISONE) 1-0.05 % cream   Yes No   Sig: Apply topically 2 (two) times a day   escitalopram (LEXAPRO) 10 mg tablet   Yes No   Sig: Take 1 tablet by mouth daily   fluticasone (FLONASE) 50 mcg/act nasal spray   Yes No   Sig: USE TWO (2) SPRAYS IN EACH NOSTRIL ONCE DAILY   hydrOXYzine HCL (ATARAX) 25 mg tablet   Yes No   Sig: Take 1 tablet by mouth 2 (two) times a day as needed   ibuprofen (MOTRIN) 400 mg tablet   Yes No   Sig: Take 400 mg by mouth every 4 (four) hours as needed   melatonin 3 mg   Yes No   Sig: Take 5 mg by mouth   montelukast (SINGULAIR) 10 mg tablet   Yes No   Sig: Take 1 tablet by mouth  daily   naproxen (Naprosyn) 500 mg tablet   No No   Sig: Take 1 tablet (500 mg total) by mouth 2 (two) times a day with meals   norethindrone-ethinyl estradiol (FEMHRT 1/5) 1-5 MG-MCG TABS   Yes No   Sig: Take 1 tablet by mouth daily   omeprazole (PriLOSEC) 40 MG capsule   Yes No   Sig: Take 40 mg by mouth daily   perphenazine 4 mg tablet   Yes No   Sig: Take 1 tabletby mouth twice a day for 2 weeks stop perphenazine 2mg   rosuvastatin (CRESTOR) 20 MG tablet   Yes No   Sig: Take 1 tablet by mouth every morning   semaglutide, 0.25 or 0.5 mg/dose, (Ozempic, 0.25 or 0.5 MG/DOSE,) 2 mg/3 mL injection pen   Yes No   Sig: Inject 0.5 mg under the skin   topiramate (TOPAMAX) 50 MG tablet   Yes No   Sig: Take 1 tablet by mouth 2 (two) times a day   venlafaxine (EFFEXOR) 75 mg tablet   Yes No   Sig: Take 75 mg by mouth daily      Facility-Administered Medications: None       Past Medical History:   Diagnosis Date    Anxiety and depression     Asthma     Bipolar disorder, unspecified (HCC)     Cerebral palsy (HCC)     Dyslipidemia     Elevated alkaline phosphatase level     GERD (gastroesophageal reflux disease)     Major depressive disorder     Migraines     Obesity without serious comorbidity     Other allergic rhinitis     Paranoia (HCC)     Prediabetes     PTSD (post-traumatic stress disorder)     Social phobia     Thrombocytosis        Past Surgical History:   Procedure Laterality Date    CHOLECYSTECTOMY      DENTAL SURGERY         History reviewed. No pertinent family history.  I have reviewed and agree with the history as documented.    E-Cigarette/Vaping    E-Cigarette Use Never User      E-Cigarette/Vaping Substances     Social History     Tobacco Use    Smoking status: Never    Smokeless tobacco: Never   Vaping Use    Vaping status: Never Used   Substance Use Topics    Alcohol use: Not Currently     Comment: ocassional    Drug use: Yes       Review of Systems   Constitutional:  Negative for chills and fever.    Respiratory:  Negative for cough, chest tightness, shortness of breath and wheezing.    Cardiovascular:  Positive for leg swelling. Negative for chest pain and palpitations.   Gastrointestinal:  Negative for abdominal pain, nausea and vomiting.   Musculoskeletal:  Negative for arthralgias, back pain, myalgias, neck pain and neck stiffness.   Skin:  Negative for color change, pallor, rash and wound.   All other systems reviewed and are negative.    Physical Exam  Physical Exam  Vitals and nursing note reviewed. Exam conducted with a chaperone present.   Constitutional:       General: She is not in acute distress.     Appearance: She is not ill-appearing or toxic-appearing.   HENT:      Head: Normocephalic and atraumatic.      Right Ear: External ear normal.      Left Ear: External ear normal.   Eyes:      General: No scleral icterus.        Right eye: No discharge.         Left eye: No discharge.      Extraocular Movements: Extraocular movements intact.   Cardiovascular:      Rate and Rhythm: Normal rate and regular rhythm.      Pulses: Normal pulses.      Heart sounds: Normal heart sounds. No murmur heard.  Pulmonary:      Effort: Pulmonary effort is normal. No respiratory distress.      Breath sounds: Normal breath sounds. No stridor. No wheezing, rhonchi or rales.   Chest:      Chest wall: No tenderness.   Abdominal:      General: Bowel sounds are normal.      Palpations: Abdomen is soft.      Tenderness: There is no abdominal tenderness. There is no guarding or rebound.   Musculoskeletal:         General: Swelling and tenderness present. No signs of injury.      Comments: Mild tenderness to palpation along the anterior/posterior aspects of the right lower leg. DP pulses palpated.  The bilateral lower extremities are edematous with R>L. The patient states that size of the left leg is normal. No cellulitic changes.    S/p right shoulder surgery. Sutures are intact. No overlying cellulitic changes.    Skin:      General: Skin is warm and dry.      Findings: Bruising present. No erythema or rash.      Comments: Mild bruising along the right upper arm.    Neurological:      General: No focal deficit present.      Mental Status: She is alert and oriented to person, place, and time.   Psychiatric:         Mood and Affect: Mood normal.         Behavior: Behavior normal.         Vital Signs  ED Triage Vitals [06/02/24 1836]   Temp Pulse Respirations Blood Pressure SpO2   -- 96 18 141/83 98 %      Temp src Heart Rate Source Patient Position - Orthostatic VS BP Location FiO2 (%)   -- -- Sitting Left arm --      Pain Score       6           Vitals:    06/02/24 1836 06/02/24 1845   BP: 141/83 141/83   Pulse: 96 90   Patient Position - Orthostatic VS: Sitting          Visual Acuity      ED Medications  Medications   enoxaparin (LOVENOX) subcutaneous injection 170 mg (170 mg Subcutaneous Given 6/2/24 1903)       Diagnostic Studies  Results Reviewed       None               VAS VENOUS DUPLEX -LOWER LIMB UNILATERAL    (Results Pending)          Procedures  Procedures     ED Course       SBIRT 22yo+      Flowsheet Row Most Recent Value   Initial Alcohol Screen: US AUDIT-C     1. How often do you have a drink containing alcohol? 0 Filed at: 06/02/2024 1838   2. How many drinks containing alcohol do you have on a typical day you are drinking?  0 Filed at: 06/02/2024 1838   3b. FEMALE Any Age, or MALE 65+: How often do you have 4 or more drinks on one occassion? 0 Filed at: 06/02/2024 1838   Audit-C Score 0 Filed at: 06/02/2024 1838   CATY: How many times in the past year have you...    Used an illegal drug or used a prescription medication for non-medical reasons? Never Filed at: 06/02/2024 1838          Medical Decision Making  This patient presents with increased pain/swelling of the right lower leg.   Diagnostic considerations include DVT, cellulitis, PE, lymphangitis.     Vital signs reviewed.  The patient is not  tachycardic/hypotensive.  No signs of respiratory distress.  SpO2 98% on room air.  She is status post right shoulder surgery this past Wednesday and has been compliant with OCPs.  Noticed increased swelling/pain along the right lower leg this afternoon.  No cellulitic changes.  The bilateral lower extremities are NVI. Due to concern for possible DVT, will administer a dose of subcutaneous Lovenox 1.5 mg/kg and place an OP RLE venous duplex order. Vascular lab contacted and message left. The patient was instructed to withhold NSAID medication for the next 24 hours as she received a dose of Lovenox. Tylenol encouraged as needed for pain. Return precautions discussed. Stable for discharge.         Problems Addressed:  Pain and swelling of right lower leg: acute illness or injury  S/P shoulder surgery: acute illness or injury    Amount and/or Complexity of Data Reviewed  External Data Reviewed: notes.     Details: [facility] orthopedics documentation reviewed.    Risk  Prescription drug management.      Disposition  Final diagnoses:   Pain and swelling of right lower leg   S/P shoulder surgery     Time reflects when diagnosis was documented in both MDM as applicable and the Disposition within this note       Time User Action Codes Description Comment    6/2/2024  6:50 PM Chaparro Valentine [M79.661,  M79.89] Pain and swelling of right lower leg     6/2/2024  6:51 PM Chaparro Valentine [Z98.890] S/P shoulder surgery           ED Disposition       ED Disposition   Discharge    Condition   Stable    Date/Time   Sun Jun 2, 2024 1851    Comment   Cele Adrian discharge to home/self care.                   Follow-up Information    None         Discharge Medication List as of 6/2/2024  6:55 PM        CONTINUE these medications which have NOT CHANGED    Details   Acetaminophen 500 MG Take 500 mg by mouth every 6 (six) hours as needed, Starting Thu 2/22/2024, Historical Med      ARIPiprazole (ABILIFY) 5 mg tablet Take 5 mg by  mouth daily, Starting Wed 2/28/2024, Historical Med      busPIRone (BUSPAR) 5 mg tablet Take 1 tablet by mouth twice daily for anxiety., Historical Med      cetirizine (ZyrTEC) 10 mg tablet Take 1 tablet by mouth daily as needed, Starting Mon 2/19/2024, Until Tue 2/18/2025 at 2359, Historical Med      clotrimazole-betamethasone (LOTRISONE) 1-0.05 % cream Apply topically 2 (two) times a day, Starting Thu 5/18/2023, Until Fri 5/17/2024, Historical Med      escitalopram (LEXAPRO) 10 mg tablet Take 1 tablet by mouth daily, Starting Wed 2/28/2024, Historical Med      fluticasone (FLONASE) 50 mcg/act nasal spray USE TWO (2) SPRAYS IN EACH NOSTRIL ONCE DAILY, Historical Med      hydrOXYzine HCL (ATARAX) 25 mg tablet Take 1 tablet by mouth 2 (two) times a day as needed, Starting Wed 2/28/2024, Historical Med      ibuprofen (MOTRIN) 400 mg tablet Take 400 mg by mouth every 4 (four) hours as needed, Starting Thu 2/22/2024, Until Wed 5/22/2024 at 2359, Historical Med      melatonin 3 mg Take 5 mg by mouth, Historical Med      Mirabegron ER 50 MG TB24 Take 1 tablet (50 mg total) by mouth in the morning, Starting Wed 5/15/2024, Normal      montelukast (SINGULAIR) 10 mg tablet Take 1 tablet by mouth daily, Starting Fri 6/9/2023, Until Mon 6/17/2024, Historical Med      naproxen (Naprosyn) 500 mg tablet Take 1 tablet (500 mg total) by mouth 2 (two) times a day with meals, Starting Sat 7/22/2023, Print      norethindrone-ethinyl estradiol (FEMHRT 1/5) 1-5 MG-MCG TABS Take 1 tablet by mouth daily, Starting Wed 4/17/2024, Historical Med      omeprazole (PriLOSEC) 40 MG capsule Take 40 mg by mouth daily, Historical Med      perphenazine 4 mg tablet Take 1 tabletby mouth twice a day for 2 weeks stop perphenazine 2mg, Historical Med      rosuvastatin (CRESTOR) 20 MG tablet Take 1 tablet by mouth every morning, Starting Fri 6/9/2023, Until Mon 6/17/2024, Historical Med      semaglutide, 0.25 or 0.5 mg/dose, (Ozempic, 0.25 or 0.5  MG/DOSE,) 2 mg/3 mL injection pen Inject 0.5 mg under the skin, Starting Tue 2/6/2024, Historical Med      topiramate (TOPAMAX) 50 MG tablet Take 1 tablet by mouth 2 (two) times a day, Starting Thu 2/8/2024, Historical Med      venlafaxine (EFFEXOR) 75 mg tablet Take 75 mg by mouth daily, Historical Med      Vibegron 75 MG TABS Take 75 mg by mouth in the morning, Starting Thu 4/25/2024, Until Wed 7/24/2024, Normal             Outpatient Discharge Orders   VAS VENOUS DUPLEX -LOWER LIMB UNILATERAL   Standing Status: Future Standing Exp. Date: 06/02/28       PDMP Review       None            ED Provider  Electronically Signed by             Chaparro Valentine DO  06/02/24 0985

## 2024-06-02 NOTE — DISCHARGE INSTRUCTIONS
You were administered a dose of subcutaneous Lovenox.  This is a blood thinner.    An outpatient ultrasound of your right lower leg was ordered to rule out a blood clot. Expect a phone call tomorrow.    For pain, you can take Tylenol 1000 mg every 6 hours. Taking ibuprofen with Lovenox can increase your bleeding risk.    Return to the emergency department for any concerning signs or symptoms.

## 2024-06-03 ENCOUNTER — HOSPITAL ENCOUNTER (OUTPATIENT)
Dept: NON INVASIVE DIAGNOSTICS | Facility: HOSPITAL | Age: 39
Discharge: HOME | End: 2024-06-03
Attending: STUDENT IN AN ORGANIZED HEALTH CARE EDUCATION/TRAINING PROGRAM
Payer: COMMERCIAL

## 2024-06-03 ENCOUNTER — OFFICE VISIT (OUTPATIENT)
Dept: PHYSICAL THERAPY | Facility: CLINIC | Age: 39
End: 2024-06-03
Payer: COMMERCIAL

## 2024-06-03 ENCOUNTER — TELEPHONE (OUTPATIENT)
Age: 39
End: 2024-06-03

## 2024-06-03 DIAGNOSIS — Z47.89 SURGICAL AFTERCARE, MUSCULOSKELETAL SYSTEM: ICD-10-CM

## 2024-06-03 DIAGNOSIS — M75.01 ADHESIVE BURSITIS OF RIGHT SHOULDER: Primary | ICD-10-CM

## 2024-06-03 DIAGNOSIS — M79.661 PAIN AND SWELLING OF RIGHT LOWER LEG: ICD-10-CM

## 2024-06-03 DIAGNOSIS — M79.89 PAIN AND SWELLING OF RIGHT LOWER LEG: ICD-10-CM

## 2024-06-03 PROCEDURE — 93971 EXTREMITY STUDY: CPT

## 2024-06-03 PROCEDURE — 97140 MANUAL THERAPY 1/> REGIONS: CPT | Performed by: PHYSICAL THERAPIST

## 2024-06-03 PROCEDURE — 97110 THERAPEUTIC EXERCISES: CPT | Performed by: PHYSICAL THERAPIST

## 2024-06-03 PROCEDURE — 93971 EXTREMITY STUDY: CPT | Performed by: SURGERY

## 2024-06-03 RX ORDER — TRAMADOL HYDROCHLORIDE 50 MG/1
50 TABLET ORAL
COMMUNITY
Start: 2024-05-29

## 2024-06-03 NOTE — TELEPHONE ENCOUNTER
Pt called to change the time of her Cystoscopy for 6/7 with , pt stated she no longer has a ride for this appointment but does not wish to reschedule. Please arrange Lyft for pt and contact her directly with  time.     Pt call back: [phone]

## 2024-06-03 NOTE — PROGRESS NOTES
"Daily Note     Today's date: 6/3/2024  Patient name: Cele Adrian  : 1985  MRN: [id number]  Referring provider: Tegan Sabillon PA-C  Dx:   Encounter Diagnosis     ICD-10-CM    1. Adhesive bursitis of right shoulder  M75.01       2. Surgical aftercare, musculoskeletal system  Z47.89                      Subjective: Patient notes that she had progressed out of her sling and is doing well overall. She notes compliance with her HEP.       Objective: See treatment diary below      Assessment: Tolerated treatment well. Patient would benefit from continued PT. Patient able to complete AAROM to 90 degrees with p-ball roll outs. Will progress to use of the pulleys and finger ladder next session. Patient responded well to use of TB in semi-reclined position for scap retractions.       Plan: Continue per plan of care.      Precautions: Asthma/ DOS 24     Daily Treatment Diary:      Initial Evaluation Date: 24  Compliance 5/30  5/31  6/3                 Visit Number 1 2  3                 Re-Eval  IE                 MC   Foto Captured Y                           5/30  5/31  6/3                 Manual                      Shld PROM/ stretch 20 min  20 min  20 min                 GH distraction/ caspular mob --> distraction 5\" x10  distraction 3 min                                       Ther-Ex                      Pendulums- CW/CCW/Flex/ Ext x10 Seated x15 Seated 1 min ea          Pulleys -->  -->  -->                 Ball roll on table -->  p-ball CW/CCCW/ flex x20 ea  p-ball CW/ Flex/ Scaption 1 min ea                 Self assist hand held flex  x10 2x10          Wand flexion  x10 x10          Finger ladder -->  -->  -->                 Seated table slides- flex/ abd  X10 5\" hold  x10 5\" hold  -->                 RC iso   -->  -->                 Digi  X10   green x20  green x30                 Wrist flex/ext x10  x20  x20                 Sup / pronation x10  x20  x20                 Bicep curl " x10  x20  x20                                       Neuro Re-Ed                      No money     semireclined OTB x15                 Lat pulldown   -          Lat row   -          Active scap retraction  X15 max cues X15                       Ther-Act                                                               Modalities                      CP  10 min seated 10 min seated                                                  Access Code: [email or web address]  URL: [email or web address]  Date: 05/30/2024  Prepared by: Beryl Jackson    Exercises  - Circular Shoulder Pendulum with Table Support  - 3 x daily - 7 x weekly - 2 sets - 10 reps  - Flexion-Extension Shoulder Pendulum with Table Support  - 3 x daily - 7 x weekly - 2 sets - 10 reps  - Seated Shoulder Flexion Towel Slide at Table Top  - 3 x daily - 7 x weekly - 2 sets - 10 reps  - Seated Shoulder Abduction Towel Slide at Table Top  - 3 x daily - 7 x weekly - 2 sets - 10 reps  - Seated Gripping Towel  - 3 x daily - 7 x weekly - 1 sets - 10 reps  - Wrist AROM Flexion Extension  - 3 x daily - 7 x weekly - 1 sets - 10 reps  - Seated Forearm Pronation and Supination AROM  - 3 x daily - 7 x weekly - 1 sets - 10 reps  - Seated Elbow Flexion and Extension AROM  - 3 x daily - 7 x weekly - 1 sets - 10 reps

## 2024-06-04 ENCOUNTER — OFFICE VISIT (OUTPATIENT)
Dept: PHYSICAL THERAPY | Facility: CLINIC | Age: 39
End: 2024-06-04
Payer: COMMERCIAL

## 2024-06-04 DIAGNOSIS — M75.01 ADHESIVE BURSITIS OF RIGHT SHOULDER: Primary | ICD-10-CM

## 2024-06-04 DIAGNOSIS — Z47.89 SURGICAL AFTERCARE, MUSCULOSKELETAL SYSTEM: ICD-10-CM

## 2024-06-04 PROCEDURE — 97110 THERAPEUTIC EXERCISES: CPT

## 2024-06-04 PROCEDURE — 97140 MANUAL THERAPY 1/> REGIONS: CPT

## 2024-06-04 NOTE — TELEPHONE ENCOUNTER
Spoke w/pt she is keeping her apt on 6/7.  Pt was made aware that we don't call for [employer] that is the pt's responsibility

## 2024-06-04 NOTE — PROGRESS NOTES
"Daily Note     Today's date: 2024  Patient name: Cele Adrian  : 1985  MRN: [id number]  Referring provider: Tegan Sabillon PA-C  Dx:   Encounter Diagnosis     ICD-10-CM    1. Adhesive bursitis of right shoulder  M75.01       2. Surgical aftercare, musculoskeletal system  Z47.89                      Subjective: Patient reports she was able to dress herself this morning for the first time in a while. She said even her friend commented how much better her shoulder moves compared to before surgery.      Objective: See treatment diary below      Assessment: Tolerated treatment well without complaint. She did have some nonpainful crepitus with hand clasp assisted flexion and then also with passive abduction. Moderate muslcle guarding through PROM at this time, but this was tolerated well. Most limitation remains in abduction as she was able to achieve approx 80-90* passively. Flexion appears approx 110* passively. Patient would benefit from continued PT to increase R shoulder strength and ROM for improved function in ADLs.      Plan: Continue per plan of care.      Precautions: Asthma/ DOS 24     Daily Treatment Diary:      Initial Evaluation Date: 24  Compliance 5/30  5/31  6/3  6/4               Visit Number 1 2  3  4               Re-Eval  IE                 MC   Foto Captured Y                           5/30  5/31  6/3  6/4               Manual                      Shld PROM/ stretch 20 min  20 min  20 min 20 min               GH distraction/ caspular mob --> distraction 5\" x10  distraction 3 min  distraction 3 min                                     Ther-Ex                      Pendulums- CW/CCW/Flex/ Ext x10 Seated x15 Seated 1 min ea Seated 1 min ea         Pulleys -->  -->  -->  2 min flx               Ball roll on table -->  p-ball CW/CCCW/ flex x20 ea  p-ball CW/ Flex/ Scaption 1 min ea p-ball CW/ Flex/ Scaption 1 min ea               Self assist hand held flex  x10 2x10 5\"x10      " "   Wand flexion  x10 x10 x10         Finger ladder -->  -->  -->  5\"x10 flx/scap               Seated table slides- flex/ abd  X10 5\" hold  x10 5\" hold  -->                 RC iso   -->  -->  5\"x5 ea manual               Digi  X10   green x20  green x30  green 30x               Wrist flex/ext x10  x20  x20 1# 20x               Sup / pronation x10  x20  x20 1# 20x               Bicep curl x10  x20  x20 1# 20x                                     Neuro Re-Ed                      No money     semireclined OTB x15  seated tactile cues x15 OTB               Lat pulldown   -          Lat row   -          Active scap retraction  X15 max cues X15 Tactile cues 15x                      Ther-Act                                                               Modalities                      CP  10 min seated 10 min seated 10 min seated                                                 Access Code: [email or web address]  URL: [email or web address]  Date: 05/30/2024  Prepared by: Beryl Jackson    Exercises  - Circular Shoulder Pendulum with Table Support  - 3 x daily - 7 x weekly - 2 sets - 10 reps  - Flexion-Extension Shoulder Pendulum with Table Support  - 3 x daily - 7 x weekly - 2 sets - 10 reps  - Seated Shoulder Flexion Towel Slide at Table Top  - 3 x daily - 7 x weekly - 2 sets - 10 reps  - Seated Shoulder Abduction Towel Slide at Table Top  - 3 x daily - 7 x weekly - 2 sets - 10 reps  - Seated Gripping Towel  - 3 x daily - 7 x weekly - 1 sets - 10 reps  - Wrist AROM Flexion Extension  - 3 x daily - 7 x weekly - 1 sets - 10 reps  - Seated Forearm Pronation and Supination AROM  - 3 x daily - 7 x weekly - 1 sets - 10 reps  - Seated Elbow Flexion and Extension AROM  - 3 x daily - 7 x weekly - 1 sets - 10 reps             "

## 2024-06-05 ENCOUNTER — OFFICE VISIT (OUTPATIENT)
Dept: PHYSICAL THERAPY | Facility: CLINIC | Age: 39
End: 2024-06-05
Payer: COMMERCIAL

## 2024-06-05 DIAGNOSIS — M75.01 ADHESIVE BURSITIS OF RIGHT SHOULDER: Primary | ICD-10-CM

## 2024-06-05 DIAGNOSIS — Z47.89 SURGICAL AFTERCARE, MUSCULOSKELETAL SYSTEM: ICD-10-CM

## 2024-06-05 PROCEDURE — 97140 MANUAL THERAPY 1/> REGIONS: CPT

## 2024-06-05 PROCEDURE — 97110 THERAPEUTIC EXERCISES: CPT

## 2024-06-05 NOTE — PROGRESS NOTES
"Daily Note     Today's date: 2024  Patient name: Cele Adrian  : 1985  MRN: [id number]  Referring provider: Tegan Sabillon PA-C  Dx:   Encounter Diagnosis     ICD-10-CM    1. Adhesive bursitis of right shoulder  M75.01       2. Surgical aftercare, musculoskeletal system  Z47.89                      Subjective: Patient reports R shoulder has been feeling good. She notes she was able to hold pizza stone when making dinner last night and she was unable to do this prior to surgery. Her next goal is to be able to walk her friends poodle mix dog on the leash.      Objective: See treatment diary below      Assessment: Tolerated treatment well without complaint. Patient required moderate verbal cues to perform exercises with appropriate technique and intensity. Patient would benefit from continued PT to increase R shoulder strength and ROM for improved function in ADLs.      Plan: Continue per plan of care.      Precautions: Asthma/ DOS 24     Daily Treatment Diary:      Initial Evaluation Date: 24  Compliance 5/30  5/31  6/3  6/4  6/5             Visit Number 1 2  3  4  5             Re-Eval  IE                 MC   Foto Captured Y                           5/30  5/31  6/3  6             Manual                      Shld PROM/ stretch 20 min  20 min  20 min 20 min  20 min             GH distraction/ caspular mob --> distraction 5\" x10  distraction 3 min  distraction 3 min distraction 3 min                                   Ther-Ex                      Pendulums- CW/CCW/Flex/ Ext x10 Seated x15 Seated 1 min ea Seated 1 min ea Seated 1 min ea        Pulleys -->  -->  -->  2 min flx  2 min ea flx/scap             Ball roll on table -->  p-ball CW/CCCW/ flex x20 ea  p-ball CW/ Flex/ Scaption 1 min ea p-ball CW/ Flex/ Scaption 1 min ea  p-ball CW/ Flex/ Scaption 1 min ea             Self assist hand held flex  x10 2x10 5\"x10 nv        Wand flexion  x10 x10 x10 Stand 10x flex/scap      " "  Finger ladder -->  -->  -->  5\"x10 flx/scap  5\"x10 flx/scap             Seated table slides- flex/ abd  X10 5\" hold  x10 5\" hold  -->                 RC iso   -->  -->  5\"x5 ea manual  5\"x5 ea manual             Digi  X10   green x20  green x30  green 30x  green 30x             Wrist flex/ext x10  x20  x20 1# 20x  1# 20x             Sup / pronation x10  x20  x20 1# 20x  1# 20x             Bicep curl x10  x20  x20 1# 20x  1# 20x                                   Neuro Re-Ed                      No money     semireclined OTB x15  seated tactile cues x15 OTB  seated tactile cues x15 OTB             Lat pulldown   -          Lat row   -          Active scap retraction  X15 max cues X15 Tactile cues 15x Tactile cues 15x        Scap ret against wall     5\"x15                     Ther-Act                                                               Modalities                      CP  10 min seated 10 min seated 10 min seated def                                                Access Code: [email or web address]  URL: [email or web address]  Date: 05/30/2024  Prepared by: Beryl Jackson    Exercises  - Circular Shoulder Pendulum with Table Support  - 3 x daily - 7 x weekly - 2 sets - 10 reps  - Flexion-Extension Shoulder Pendulum with Table Support  - 3 x daily - 7 x weekly - 2 sets - 10 reps  - Seated Shoulder Flexion Towel Slide at Table Top  - 3 x daily - 7 x weekly - 2 sets - 10 reps  - Seated Shoulder Abduction Towel Slide at Table Top  - 3 x daily - 7 x weekly - 2 sets - 10 reps  - Seated Gripping Towel  - 3 x daily - 7 x weekly - 1 sets - 10 reps  - Wrist AROM Flexion Extension  - 3 x daily - 7 x weekly - 1 sets - 10 reps  - Seated Forearm Pronation and Supination AROM  - 3 x daily - 7 x weekly - 1 sets - 10 reps  - Seated Elbow Flexion and Extension AROM  - 3 x daily - 7 x weekly - 1 sets - 10 reps               "

## 2024-06-06 ENCOUNTER — OFFICE VISIT (OUTPATIENT)
Dept: PHYSICAL THERAPY | Facility: CLINIC | Age: 39
End: 2024-06-06
Payer: COMMERCIAL

## 2024-06-06 DIAGNOSIS — Z47.89 SURGICAL AFTERCARE, MUSCULOSKELETAL SYSTEM: ICD-10-CM

## 2024-06-06 DIAGNOSIS — M75.01 ADHESIVE BURSITIS OF RIGHT SHOULDER: Primary | ICD-10-CM

## 2024-06-06 PROCEDURE — 97110 THERAPEUTIC EXERCISES: CPT | Performed by: PHYSICAL THERAPIST

## 2024-06-06 PROCEDURE — 97140 MANUAL THERAPY 1/> REGIONS: CPT | Performed by: PHYSICAL THERAPIST

## 2024-06-06 NOTE — PROGRESS NOTES
"Daily Note     Today's date: 2024  Patient name: Cele Adrian  : 1985  MRN: [id number]  Referring provider: Tegan Sabillon PA-C  Dx:   Encounter Diagnosis     ICD-10-CM    1. Adhesive bursitis of right shoulder  M75.01       2. Surgical aftercare, musculoskeletal system  Z47.89                      Subjective: Patient notes that she does have some pain today which she feels is due to the stitches pulling on her skin. She notes compliance with her HEP.       Objective: See treatment diary below      Assessment: Tolerated treatment well. Patient would benefit from continued PT. Patient progressing with AROM/ AAROM/ PROM of the right shoulder. Patient demonstrates minimal swelling at the shoulder this date. Patient's HEP will be progressed next session for addition of strength.      Plan: Continue per plan of care.      Precautions: Asthma/ DOS 24     Daily Treatment Diary:      Initial Evaluation Date: 24  Compliance 5/30  5/31  6/3  6           Visit Number 1 2  3  4  5  6           Re-Eval  IE                 MC   Foto Captured Y                             6/3  6           Manual                      Shld PROM/ stretch 20 min  20 min  20 min 20 min  20 min  20 min           GH distraction/ caspular mob --> distraction 5\" x10  distraction 3 min  distraction 3 min distraction 3 min  5 min A/ P glides                                 Ther-Ex                      Pendulums- CW/CCW/Flex/ Ext x10 Seated x15 Seated 1 min ea Seated 1 min ea Seated 1 min ea Seated 1 min ea       Pulleys -->  -->  -->  2 min flx  2 min ea flx/scap  2 min           Ball roll on table -->  p-ball CW/CCCW/ flex x20 ea  p-ball CW/ Flex/ Scaption 1 min ea p-ball CW/ Flex/ Scaption 1 min ea  p-ball CW/ Flex/ Scaption 1 min ea  p-ball CW/ Flex/ scap 1 min ea           Self assist hand held flex  x10 2x10 5\"x10 nv 5\" x10 supine       Wand flexion  x10 x10 x10 Stand 10x flex/scap Stand " "flex/ scap 10x       Finger ladder -->  -->  -->  5\"x10 flx/scap  5\"x10 flx/scap  5\" i88isim/ scap           Seated table slides- flex/ abd  X10 5\" hold  x10 5\" hold  -->                 RC iso   -->  -->  5\"x5 ea manual  5\"x5 ea manual             Digi  X10   green x20  green x30  green 30x  green 30x  D/C           Wrist flex/ext x10  x20  x20 1# 20x  1# 20x  D/C           Sup / pronation x10  x20  x20 1# 20x  1# 20x  D/C           Bicep curl x10  x20  x20 1# 20x  1# 20x  1# 20x           TB PRE for shld           red ext x10           Neuro Re-Ed                      No money     semireclined OTB x15  seated tactile cues x15 OTB  seated tactile cues x15 OTB  seated tactiles x15 OTB           Lat pulldown   -          Lat row   -          Active scap retraction  X15 max cues X15 Tactile cues 15x Tactile cues 15x -       Scap ret against wall     5\"x15 X15 5\"                    Ther-Act                                                               Modalities                      CP  10 min seated 10 min seated 10 min seated def 10 min supine                                               Access Code: [email or web address]  URL: [email or web address]  Date: 05/30/2024  Prepared by: Beryl Jackson    Exercises  - Circular Shoulder Pendulum with Table Support  - 3 x daily - 7 x weekly - 2 sets - 10 reps  - Flexion-Extension Shoulder Pendulum with Table Support  - 3 x daily - 7 x weekly - 2 sets - 10 reps  - Seated Shoulder Flexion Towel Slide at Table Top  - 3 x daily - 7 x weekly - 2 sets - 10 reps  - Seated Shoulder Abduction Towel Slide at Table Top  - 3 x daily - 7 x weekly - 2 sets - 10 reps  - Seated Gripping Towel  - 3 x daily - 7 x weekly - 1 sets - 10 reps  - Wrist AROM Flexion Extension  - 3 x daily - 7 x weekly - 1 sets - 10 reps  - Seated Forearm Pronation and Supination AROM  - 3 x daily - 7 x weekly - 1 sets - 10 reps  - Seated Elbow Flexion and Extension AROM  - 3 x daily - 7 x weekly - 1 sets " - 10 reps

## 2024-06-07 ENCOUNTER — OFFICE VISIT (OUTPATIENT)
Dept: PHYSICAL THERAPY | Facility: CLINIC | Age: 39
End: 2024-06-07
Payer: COMMERCIAL

## 2024-06-07 DIAGNOSIS — Z47.89 SURGICAL AFTERCARE, MUSCULOSKELETAL SYSTEM: ICD-10-CM

## 2024-06-07 DIAGNOSIS — M75.01 ADHESIVE BURSITIS OF RIGHT SHOULDER: Primary | ICD-10-CM

## 2024-06-07 PROCEDURE — 97110 THERAPEUTIC EXERCISES: CPT | Performed by: PHYSICAL THERAPIST

## 2024-06-07 PROCEDURE — 97140 MANUAL THERAPY 1/> REGIONS: CPT | Performed by: PHYSICAL THERAPIST

## 2024-06-07 PROCEDURE — 97112 NEUROMUSCULAR REEDUCATION: CPT | Performed by: PHYSICAL THERAPIST

## 2024-06-07 NOTE — PROGRESS NOTES
"Daily Note     Today's date: 2024  Patient name: Cele Adrian  : 1985  MRN: [id number]  Referring provider: Tegan Sabillon PA-C  Dx:   Encounter Diagnosis     ICD-10-CM    1. Adhesive bursitis of right shoulder  M75.01       2. Surgical aftercare, musculoskeletal system  Z47.89                      Subjective: Patient notes that she is able to dress herself. She notes improving pain complaints.       Objective: See treatment diary below      Assessment: Tolerated treatment well. Patient would benefit from continued PT. Able to reach 106 degrees of shoulder flexion. Patient does have GH joint crepitus with ER to 20-30 degrees that is mildly painful. Patient is able to complete RC strengthening today with TB for IR/ER. Patient continues with difficulty activating the rhomboids/ middle trap for scapular retractions.       Plan: Continue per plan of care.      Precautions: Asthma/ DOS 24     Daily Treatment Diary:      Initial Evaluation Date: 24  Compliance   6/3  6/4  6/  6         Visit Number 1 2  3  4  5  6  7         Re-Eval  IE                 MC   Foto Captured Y                             6/3  6/4 6/5  6         Manual                      Shld PROM/ stretch 20 min  20 min  20 min 20 min  20 min  20 min  20 min         GH distraction/ caspular mob --> distraction 5\" x10  distraction 3 min  distraction 3 min distraction 3 min  5 min A/ P glides  5 min A/P glides                               Ther-Ex                      Pendulums- CW/CCW/Flex/ Ext x10 Seated x15 Seated 1 min ea Seated 1 min ea Seated 1 min ea Seated 1 min ea Seated 1 min ea      Pulleys -->  -->  -->  2 min flx  2 min ea flx/scap  2 min  2 minx2         Ball roll on table -->  p-ball CW/CCCW/ flex x20 ea  p-ball CW/ Flex/ Scaption 1 min ea p-ball CW/ Flex/ Scaption 1 min ea  p-ball CW/ Flex/ Scaption 1 min ea  p-ball CW/ Flex/ scap 1 min ea  p-ball CW/Flex/ scap 1 min ea       " "  Self assist hand held flex  x10 2x10 5\"x10 nv 5\" x10 supine 5\" x10 standing      Wand flexion  x10 x10 x10 Stand 10x flex/scap Stand flex/ scap 10x Stand flex/ scap x10      Finger ladder -->  -->  -->  5\"x10 flx/scap  5\"x10 flx/scap  5\" [id number]/ scap  5\" x10 flex         Seated table slides- flex/ abd  X10 5\" hold  x10 5\" hold  -->                 RC iso   -->  -->  5\"x5 ea manual  5\"x5 ea manual             Digi  X10   green x20  green x30  green 30x  green 30x  D/C           Wrist flex/ext x10  x20  x20 1# 20x  1# 20x  D/C           Sup / pronation x10  x20  x20 1# 20x  1# 20x  D/C           Bicep curl x10  x20  x20 1# 20x  1# 20x  1# 20x  1# 20x         TB PRE for shld           red ext x10 YTB- IR/ER x15         Neuro Re-Ed                      No money     semireclined OTB x15  seated tactile cues x15 OTB  seated tactile cues x15 OTB  seated tactiles x15 OTB           B Straight Arm Extension    -    YTB x10      TB- ER/IR/    -          Active scap retraction  X15 max cues X15 Tactile cues 15x Tactile cues 15x -       Scap ret against wall     5\"x15 X15 5\" X15 5\"                   Ther-Act                                                               Modalities                      CP  10 min seated 10 min seated 10 min seated def 10 min supine 10 min seated                                              Access Code: [email or web address]  URL: [email or web address]  Date: 05/30/2024  Prepared by: Beryl Jackson    Exercises  - Circular Shoulder Pendulum with Table Support  - 3 x daily - 7 x weekly - 2 sets - 10 reps  - Flexion-Extension Shoulder Pendulum with Table Support  - 3 x daily - 7 x weekly - 2 sets - 10 reps  - Seated Shoulder Flexion Towel Slide at Table Top  - 3 x daily - 7 x weekly - 2 sets - 10 reps  - Seated Shoulder Abduction Towel Slide at Table Top  - 3 x daily - 7 x weekly - 2 sets - 10 reps  - Seated Gripping Towel  - 3 x daily - 7 x weekly - 1 sets - 10 reps  - Wrist AROM " Flexion Extension  - 3 x daily - 7 x weekly - 1 sets - 10 reps  - Seated Forearm Pronation and Supination AROM  - 3 x daily - 7 x weekly - 1 sets - 10 reps  - Seated Elbow Flexion and Extension AROM  - 3 x daily - 7 x weekly - 1 sets - 10 reps

## 2024-06-10 ENCOUNTER — APPOINTMENT (OUTPATIENT)
Dept: PHYSICAL THERAPY | Facility: CLINIC | Age: 39
End: 2024-06-10
Payer: COMMERCIAL

## 2024-06-11 ENCOUNTER — OFFICE VISIT (OUTPATIENT)
Dept: PHYSICAL THERAPY | Facility: CLINIC | Age: 39
End: 2024-06-11
Payer: COMMERCIAL

## 2024-06-11 DIAGNOSIS — Z47.89 SURGICAL AFTERCARE, MUSCULOSKELETAL SYSTEM: ICD-10-CM

## 2024-06-11 DIAGNOSIS — M75.01 ADHESIVE BURSITIS OF RIGHT SHOULDER: Primary | ICD-10-CM

## 2024-06-11 PROCEDURE — 97140 MANUAL THERAPY 1/> REGIONS: CPT

## 2024-06-11 PROCEDURE — 97110 THERAPEUTIC EXERCISES: CPT

## 2024-06-11 PROCEDURE — 97112 NEUROMUSCULAR REEDUCATION: CPT

## 2024-06-11 NOTE — PROGRESS NOTES
"Daily Note     Today's date: 2024  Patient name: Cele Adrian  : 1985  MRN: [id number]  Referring provider: Tegan Sabillon PA-C  Dx:   Encounter Diagnosis     ICD-10-CM    1. Adhesive bursitis of right shoulder  M75.01       2. Surgical aftercare, musculoskeletal system  Z47.89                      Subjective: Patient reports she was able to do cleaning on  using primarily her R UE. States she thinks she slep funny as she has some soreness in her R UT region.      Objective: See treatment diary below      Assessment: Tolerated treatment well without complaint. Patient required moderate verbal cues to perform exercises with appropriate technique and intensity. Patient would benefit from continued PT to R shoulder ROM and strength for improved function in daily activities.      Plan: Continue per plan of care.      Precautions: Asthma/ DOS 24     Daily Treatment Diary:      Initial Evaluation Date: 24  Compliance   6/3  6/4  6  6/       Visit Number 1 2  3  4  5  6  7  8       Re-Eval  IE                 MC   Foto Captured Y             Y                6/3  6/4 6/5  6/6  6       Manual                      Shld PROM/ stretch 20 min  20 min  20 min 20 min  20 min  20 min  20 min  15 min       GH distraction/ caspular mob --> distraction 5\" x10  distraction 3 min  distraction 3 min distraction 3 min  5 min A/ P glides  5 min A/P glides  R UT gentle stm 5m                             Ther-Ex                      UBE        2'/2'     Pendulums- CW/CCW/Flex/ Ext x10 Seated x15 Seated 1 min ea Seated 1 min ea Seated 1 min ea Seated 1 min ea Seated 1 min ea Seated 1 min ea     Pulleys -->  -->  -->  2 min flx  2 min ea flx/scap  2 min  2 minx2  2  min ea       Ball roll on table -->  p-ball CW/CCCW/ flex x20 ea  p-ball CW/ Flex/ Scaption 1 min ea p-ball CW/ Flex/ Scaption 1 min ea  p-ball CW/ Flex/ Scaption 1 min ea  p-ball CW/ Flex/ scap 1 min " "ea  p-ball CW/Flex/ scap 1 min ea  p-ball CW/Flex/ scap 1 min ea       Self assist hand held flex  x10 2x10 5\"x10 nv 5\" x10 supine 5\" x10 standing 5\" x10 standing     Wand flexion  x10 x10 x10 Stand 10x flex/scap Stand flex/ scap 10x Stand flex/ scap x10 Stand flex/ scap x10     Finger ladder -->  -->  -->  5\"x10 flx/scap  5\"x10 flx/scap  5\" [id number]/ scap  5\" x10 flex  5\"x10 flex/ scap       Seated table slides- flex/ abd  X10 5\" hold  x10 5\" hold  -->                 RC iso   -->  -->  5\"x5 ea manual  5\"x5 ea manual             Digi  X10   green x20  green x30  green 30x  green 30x  D/C           Wrist flex/ext x10  x20  x20 1# 20x  1# 20x  D/C           Sup / pronation x10  x20  x20 1# 20x  1# 20x  D/C           Bicep curl x10  x20  x20 1# 20x  1# 20x  1# 20x  1# 20x 2# 20x       TB PRE for shld           red ext x10 YTB- IR/ER x15  YTB- IR/ER x15       Neuro Re-Ed                      No money     semireclined OTB x15  seated tactile cues x15 OTB  seated tactile cues x15 OTB  seated tactiles x15 OTB           B Straight Arm Extension    -    YTB x10 YTB 15x     TB- ER/IR/    -          Active scap retraction  X15 max cues X15 Tactile cues 15x Tactile cues 15x -       Scap ret against wall     5\"x15 X15 5\" X15 5\" 5\"X15                  Ther-Act                                                               Modalities                      CP  10 min seated 10 min seated 10 min seated def 10 min supine 10 min seated 10 min seated                                             Access Code: [email or web address]  URL: [email or web address]  Date: 05/30/2024  Prepared by: Beryl Jackson    Exercises  - Circular Shoulder Pendulum with Table Support  - 3 x daily - 7 x weekly - 2 sets - 10 reps  - Flexion-Extension Shoulder Pendulum with Table Support  - 3 x daily - 7 x weekly - 2 sets - 10 reps  - Seated Shoulder Flexion Towel Slide at Table Top  - 3 x daily - 7 x weekly - 2 sets - 10 reps  - Seated Shoulder " Abduction Towel Slide at Table Top  - 3 x daily - 7 x weekly - 2 sets - 10 reps  - Seated Gripping Towel  - 3 x daily - 7 x weekly - 1 sets - 10 reps  - Wrist AROM Flexion Extension  - 3 x daily - 7 x weekly - 1 sets - 10 reps  - Seated Forearm Pronation and Supination AROM  - 3 x daily - 7 x weekly - 1 sets - 10 reps  - Seated Elbow Flexion and Extension AROM  - 3 x daily - 7 x weekly - 1 sets - 10 reps

## 2024-06-12 ENCOUNTER — APPOINTMENT (OUTPATIENT)
Dept: PHYSICAL THERAPY | Facility: CLINIC | Age: 39
End: 2024-06-12
Payer: COMMERCIAL

## 2024-06-14 ENCOUNTER — OFFICE VISIT (OUTPATIENT)
Dept: PHYSICAL THERAPY | Facility: CLINIC | Age: 39
End: 2024-06-14
Payer: COMMERCIAL

## 2024-06-14 DIAGNOSIS — M75.01 ADHESIVE BURSITIS OF RIGHT SHOULDER: Primary | ICD-10-CM

## 2024-06-14 DIAGNOSIS — Z47.89 SURGICAL AFTERCARE, MUSCULOSKELETAL SYSTEM: ICD-10-CM

## 2024-06-14 PROCEDURE — 97110 THERAPEUTIC EXERCISES: CPT

## 2024-06-14 PROCEDURE — 97140 MANUAL THERAPY 1/> REGIONS: CPT

## 2024-06-14 PROCEDURE — 97112 NEUROMUSCULAR REEDUCATION: CPT

## 2024-06-14 NOTE — PROGRESS NOTES
"Daily Note     Today's date: 2024  Patient name: Cele Adrian  : 1985  MRN: [id number]  Referring provider: Tegan Sabillon PA-C  Dx:   Encounter Diagnosis     ICD-10-CM    1. Adhesive bursitis of right shoulder  M75.01       2. Surgical aftercare, musculoskeletal system  Z47.89                      Subjective: Patient notes f/u went well and surgeon was pleased with progress. He said to continue PT until we feel she is ready to DC. She denies pain symptoms in R shoulder lately.      Objective: See treatment diary below      Assessment: Tolerated treatment well without complaint. Patient required moderate verbal cues to perform exercises with appropriate technique and intensity. Patient would benefit from continued PT to increase R shoulder strength and ROM for improved function in ADLs.      Plan: Continue per plan of care.      Precautions: Asthma/ DOS 24     Daily Treatment Diary:      Initial Evaluation Date: 24  Compliance   6/3  6/4  6/5  6/6  6/     Visit Number 1 2  3  4  5  6  7  8  9     Re-Eval  IE                 MC   Foto Captured Y             Y                6/3  6/4 6/5  6/6  6/7       Manual                      Shld PROM/ stretch 20 min  20 min  20 min 20 min  20 min  20 min  20 min  15 min  15 min     GH distraction/ caspular mob --> distraction 5\" x10  distraction 3 min  distraction 3 min distraction 3 min  5 min A/ P glides  5 min A/P glides  R UT gentle stm 5m  R UT gentle 5m                           Ther-Ex                      UBE        2'/2' 3'/3'    Pendulums- CW/CCW/Flex/ Ext x10 Seated x15 Seated 1 min ea Seated 1 min ea Seated 1 min ea Seated 1 min ea Seated 1 min ea Seated 1 min ea     Pulleys -->  -->  -->  2 min flx  2 min ea flx/scap  2 min  2 minx2  2  min ea  2  min ea     Ball roll on table -->  p-ball CW/CCCW/ flex x20 ea  p-ball CW/ Flex/ Scaption 1 min ea p-ball CW/ Flex/ Scaption 1 min ea  p-ball CW/ " "Flex/ Scaption 1 min ea  p-ball CW/ Flex/ scap 1 min ea  p-ball CW/Flex/ scap 1 min ea  p-ball CW/Flex/ scap 1 min ea   p-ball CW/Flex/ scap 1 min ea     Self assist hand held flex  x10 2x10 5\"x10 nv 5\" x10 supine 5\" x10 standing 5\" x10 standing 5\" x10 standing    Wand flexion  x10 x10 x10 Stand 10x flex/scap Stand flex/ scap 10x Stand flex/ scap x10 Stand flex/ scap x10 Stand flex/ scap x10    Finger ladder -->  -->  -->  5\"x10 flx/scap  5\"x10 flx/scap  5\" [id number]/ scap  5\" x10 flex  5\"x10 flex/ scap  5\"x10 flex/ scap     Seated table slides- flex/ abd  X10 5\" hold  x10 5\" hold  -->            supine chest press 1# wand 15x     RC iso   -->  -->  5\"x5 ea manual  5\"x5 ea manual             Digi  X10   green x20  green x30  green 30x  green 30x  D/C           Wrist flex/ext x10  x20  x20 1# 20x  1# 20x  D/C           Sup / pronation x10  x20  x20 1# 20x  1# 20x  D/C           Bicep curl x10  x20  x20 1# 20x  1# 20x  1# 20x  1# 20x 2# 20x  2# 20x     TB PRE for shld           red ext x10 YTB- IR/ER x15  YTB- IR/ER x15  YTB- IR/ER x15     Neuro Re-Ed                      No money     semireclined OTB x15  seated tactile cues x15 OTB  seated tactile cues x15 OTB  seated tactiles x15 OTB           B Straight Arm Extension    -    YTB x10 YTB 15x YTB 15x    TB- ER/IR/    -          Active scap retraction  X15 max cues X15 Tactile cues 15x Tactile cues 15x -       Scap ret against wall     5\"x15 X15 5\" X15 5\" 5\"X15 5\"X15                 Ther-Act                                                               Modalities                      CP  10 min seated 10 min seated 10 min seated def 10 min supine 10 min seated 10 min seated 10 min seated                                            Access Code: [email or web address]  URL: [email or web address]  Date: 05/30/2024  Prepared by: Beryl Jackson    Exercises  - Circular Shoulder Pendulum with Table Support  - 3 x daily - 7 x weekly - 2 sets - 10 reps  - " Flexion-Extension Shoulder Pendulum with Table Support  - 3 x daily - 7 x weekly - 2 sets - 10 reps  - Seated Shoulder Flexion Towel Slide at Table Top  - 3 x daily - 7 x weekly - 2 sets - 10 reps  - Seated Shoulder Abduction Towel Slide at Table Top  - 3 x daily - 7 x weekly - 2 sets - 10 reps  - Seated Gripping Towel  - 3 x daily - 7 x weekly - 1 sets - 10 reps  - Wrist AROM Flexion Extension  - 3 x daily - 7 x weekly - 1 sets - 10 reps  - Seated Forearm Pronation and Supination AROM  - 3 x daily - 7 x weekly - 1 sets - 10 reps  - Seated Elbow Flexion and Extension AROM  - 3 x daily - 7 x weekly - 1 sets - 10 reps

## 2024-06-18 ENCOUNTER — OFFICE VISIT (OUTPATIENT)
Dept: PHYSICAL THERAPY | Facility: CLINIC | Age: 39
End: 2024-06-18
Payer: COMMERCIAL

## 2024-06-18 DIAGNOSIS — Z47.89 SURGICAL AFTERCARE, MUSCULOSKELETAL SYSTEM: ICD-10-CM

## 2024-06-18 DIAGNOSIS — M75.01 ADHESIVE BURSITIS OF RIGHT SHOULDER: Primary | ICD-10-CM

## 2024-06-18 PROCEDURE — 97110 THERAPEUTIC EXERCISES: CPT | Performed by: PHYSICAL THERAPIST

## 2024-06-18 PROCEDURE — 97140 MANUAL THERAPY 1/> REGIONS: CPT | Performed by: PHYSICAL THERAPIST

## 2024-06-18 PROCEDURE — 97112 NEUROMUSCULAR REEDUCATION: CPT | Performed by: PHYSICAL THERAPIST

## 2024-06-18 NOTE — PROGRESS NOTES
"Daily Note     Today's date: 2024  Patient name: Cele Adrian  : 1985  MRN: [id number]  Referring provider: Tegan Sabillon PA-C  Dx:   Encounter Diagnosis     ICD-10-CM    1. Adhesive bursitis of right shoulder  M75.01       2. Surgical aftercare, musculoskeletal system  Z47.89                      Subjective: Patient notes she is using the RUE for more activity with decreased pain complaints.       Objective: See treatment diary below      Assessment: Tolerated treatment well. Patient would benefit from continued PT. Patient progressing with tolerance to stretching of the right shoulder however is unable to flex her shoulder greater than 100 degrees at this time. She continues with painful endranges of motions.       Plan: Continue per plan of care.      Precautions: Asthma/ DOS 24     Daily Treatment Diary:      Initial Evaluation Date: 24  Compliance 5/30  5/31  6/3  6  6  6  6/   Visit Number 1 2  3  4  5  6  7  8  9  10   Re-Eval  IE                    Foto Captured Y             Y                63  6/4 6/5  6/6  6/7     Manual                      Shld PROM/ stretch 20 min  20 min  20 min 20 min  20 min  20 min  20 min  15 min  15 min  15 min   GH distraction/ caspular mob --> distraction 5\" x10  distraction 3 min  distraction 3 min distraction 3 min  5 min A/ P glides  5 min A/P glides  R UT gentle stm 5m  R UT gentle 5m  R UT gentle 5m                         Ther-Ex                      UBE        2'/2' 3'/3'    Pendulums- CW/CCW/Flex/ Ext x10 Seated x15 Seated 1 min ea Seated 1 min ea Seated 1 min ea Seated 1 min ea Seated 1 min ea Seated 1 min ea     Pulleys -->  -->  -->  2 min flx  2 min ea flx/scap  2 min  2 minx2  2  min ea  2  min ea  3 min   Ball roll on table -->  p-ball CW/CCCW/ flex x20 ea  p-ball CW/ Flex/ Scaption 1 min ea p-ball CW/ Flex/ Scaption 1 min ea  p-ball CW/ Flex/ Scaption 1 min ea  p-ball CW/ Flex/ " "scap 1 min ea  p-ball CW/Flex/ scap 1 min ea  p-ball CW/Flex/ scap 1 min ea   p-ball CW/Flex/ scap 1 min ea  p-ball CW/ Flex/ scap 1 min ea   Self assist hand held flex  x10 2x10 5\"x10 nv 5\" x10 supine 5\" x10 standing 5\" x10 standing 5\" x10 standing 5\" x10   Wand flexion  x10 x10 x10 Stand 10x flex/scap Stand flex/ scap 10x Stand flex/ scap x10 Stand flex/ scap x10 Stand flex/ scap x10 2# stand flex/ scap x10   Finger ladder -->  -->  -->  5\"x10 flx/scap  5\"x10 flx/scap  5\" [id number]/ scap  5\" x10 flex  5\"x10 flex/ scap  5\"x10 flex/ scap  5\" x10 flex/ scap   Seated table slides- flex/ abd  X10 5\" hold  x10 5\" hold  -->            supine chest press 1# wand 15x  supine chest press 2# x15   RC iso   -->  -->  5\"x5 ea manual  5\"x5 ea manual             Digi  X10   green x20  green x30  green 30x  green 30x  D/C           Wrist flex/ext x10  x20  x20 1# 20x  1# 20x  D/C           Sup / pronation x10  x20  x20 1# 20x  1# 20x  D/C           Bicep curl x10  x20  x20 1# 20x  1# 20x  1# 20x  1# 20x 2# 20x  2# 20x  3# 20x   TB PRE for shld           red ext x10 YTB- IR/ER x15  YTB- IR/ER x15  YTB- IR/ER x15  green IR/ ER/ punch x15   Neuro Re-Ed                      No money     semireclined OTB x15  seated tactile cues x15 OTB  seated tactile cues x15 OTB  seated tactiles x15 OTB           B Straight Arm Extension    -    YTB x10 YTB 15x YTB 15x GTB x15      -          Active scap retraction  X15 max cues X15 Tactile cues 15x Tactile cues 15x -       Scap ret against wall     5\"x15 X15 5\" X15 5\" 5\"X15 5\"X15 5\" x15                Ther-Act                                                               Modalities                      CP  10 min seated 10 min seated 10 min seated def 10 min supine 10 min seated 10 min seated 10 min seated 10 min seated                                           Access Code: [email or web address]  URL: [email or web address]  Date: 05/30/2024  Prepared by: Beryl Jackson    Exercises  - " Circular Shoulder Pendulum with Table Support  - 3 x daily - 7 x weekly - 2 sets - 10 reps  - Flexion-Extension Shoulder Pendulum with Table Support  - 3 x daily - 7 x weekly - 2 sets - 10 reps  - Seated Shoulder Flexion Towel Slide at Table Top  - 3 x daily - 7 x weekly - 2 sets - 10 reps  - Seated Shoulder Abduction Towel Slide at Table Top  - 3 x daily - 7 x weekly - 2 sets - 10 reps  - Seated Gripping Towel  - 3 x daily - 7 x weekly - 1 sets - 10 reps  - Wrist AROM Flexion Extension  - 3 x daily - 7 x weekly - 1 sets - 10 reps  - Seated Forearm Pronation and Supination AROM  - 3 x daily - 7 x weekly - 1 sets - 10 reps  - Seated Elbow Flexion and Extension AROM  - 3 x daily - 7 x weekly - 1 sets - 10 reps

## 2024-06-19 ENCOUNTER — APPOINTMENT (OUTPATIENT)
Dept: PHYSICAL THERAPY | Facility: CLINIC | Age: 39
End: 2024-06-19
Payer: COMMERCIAL

## 2024-06-21 ENCOUNTER — OFFICE VISIT (OUTPATIENT)
Dept: PHYSICAL THERAPY | Facility: CLINIC | Age: 39
End: 2024-06-21
Payer: COMMERCIAL

## 2024-06-21 DIAGNOSIS — Z47.89 SURGICAL AFTERCARE, MUSCULOSKELETAL SYSTEM: ICD-10-CM

## 2024-06-21 DIAGNOSIS — M75.01 ADHESIVE BURSITIS OF RIGHT SHOULDER: Primary | ICD-10-CM

## 2024-06-21 PROCEDURE — 97110 THERAPEUTIC EXERCISES: CPT | Performed by: PHYSICAL THERAPIST

## 2024-06-21 PROCEDURE — 97140 MANUAL THERAPY 1/> REGIONS: CPT | Performed by: PHYSICAL THERAPIST

## 2024-06-21 NOTE — PROGRESS NOTES
"Daily Note     Today's date: 2024  Patient name: Cele Adrian  : 1985  MRN: [id number]  Referring provider: Tegan Sabillon PA-C  Dx:   Encounter Diagnosis     ICD-10-CM    1. Adhesive bursitis of right shoulder  M75.01       2. Surgical aftercare, musculoskeletal system  Z47.89                      Subjective: Patient notes that her shoulder is feeling good this date.      Objective: See treatment diary below      Assessment: Tolerated treatment well. Patient would benefit from continued PT      Plan: Continue per plan of care.      Precautions: Asthma/ DOS 24     Daily Treatment Diary:      Initial Evaluation Date: 24  Compliance 6/21   6/3  6/4  6/5  6/6  6/7  6/11  6/14  6/18   Visit Number 11   3  4  5  6  7  8  9  10   Re-Eval                      Foto Captured              Y              6/21   6/3  6 6  6   Manual                      Shld PROM/ stretch 20 min   20 min 20 min  20 min  20 min  20 min  15 min  15 min  15 min   GH distraction/ caspular mob 5 min   distraction 3 min  distraction 3 min distraction 3 min  5 min A/ P glides  5 min A/P glides  R UT gentle stm 5m  R UT gentle 5m  R UT gentle 5m                         Ther-Ex                      UBE 3'/3'       2'/2' 3'/3'    Pendulums- CW/CCW/Flex/ Ext x10  Seated 1 min ea Seated 1 min ea Seated 1 min ea Seated 1 min ea Seated 1 min ea Seated 1 min ea     Pulleys 5 min   -->  2 min flx  2 min ea flx/scap  2 min  2 minx2  2  min ea  2  min ea  3 min   Ball roll on table p-ball CW/ Flex/ scap 1 min ea   p-ball CW/ Flex/ Scaption 1 min ea p-ball CW/ Flex/ Scaption 1 min ea  p-ball CW/ Flex/ Scaption 1 min ea  p-ball CW/ Flex/ scap 1 min ea  p-ball CW/Flex/ scap 1 min ea  p-ball CW/Flex/ scap 1 min ea   p-ball CW/Flex/ scap 1 min ea  p-ball CW/ Flex/ scap 1 min ea   Self assist hand held flex 5\" 2x10  2x10 5\"x10 nv 5\" x10 supine 5\" x10 standing 5\" x10 standing 5\" x10 standing 5\" x10   Wand " "flexion 2# stand flex/ scap x10  x10 x10 Stand 10x flex/scap Stand flex/ scap 10x Stand flex/ scap x10 Stand flex/ scap x10 Stand flex/ scap x10 2# stand flex/ scap x10   Finger ladder 5\" x10 flex/ scap   -->  5\"x10 flx/scap  5\"x10 flx/scap  5\" [id number]/ scap  5\" x10 flex  5\"x10 flex/ scap  5\"x10 flex/ scap  5\" x10 flex/ scap   Seated table slides- flex/ abd supine chest press 2# x15   -->            supine chest press 1# wand 15x  supine chest press 2# x15   RC iso    -->  5\"x5 ea manual  5\"x5 ea manual             Digi  X10    green x30  green 30x  green 30x  D/C           Wrist flex/ext x10   x20 1# 20x  1# 20x  D/C           Sup / pronation x10   x20 1# 20x  1# 20x  D/C           Bicep curl x10   x20 1# 20x  1# 20x  1# 20x  1# 20x 2# 20x  2# 20x  3# 20x   TB PRE for shld GTB IR/ER/Ext/ Punch x15          red ext x10 YTB- IR/ER x15  YTB- IR/ER x15  YTB- IR/ER x15  green IR/ ER/ punch x15   Neuro Re-Ed                      No money     semireclined OTB x15  seated tactile cues x15 OTB  seated tactile cues x15 OTB  seated tactiles x15 OTB           B Straight Arm Extension  GTB x15  -    YTB x10 YTB 15x YTB 15x GTB x15      -          Active scap retraction   X15 Tactile cues 15x Tactile cues 15x -       Scap ret against wall 5\" x15    5\"x15 X15 5\" X15 5\" 5\"X15 5\"X15 5\" x15                Ther-Act                                                               Modalities                      CP 10 min seated  10 min seated 10 min seated def 10 min supine 10 min seated 10 min seated 10 min seated 10 min seated                                           Access Code: [email or web address]  URL: [email or web address]  Date: 05/30/2024  Prepared by: Beryl Dunkelberger    Exercises  - Circular Shoulder Pendulum with Table Support  - 3 x daily - 7 x weekly - 2 sets - 10 reps  - Flexion-Extension Shoulder Pendulum with Table Support  - 3 x daily - 7 x weekly - 2 sets - 10 reps  - Seated Shoulder Flexion Towel Slide at " Table Top  - 3 x daily - 7 x weekly - 2 sets - 10 reps  - Seated Shoulder Abduction Towel Slide at Table Top  - 3 x daily - 7 x weekly - 2 sets - 10 reps  - Seated Gripping Towel  - 3 x daily - 7 x weekly - 1 sets - 10 reps  - Wrist AROM Flexion Extension  - 3 x daily - 7 x weekly - 1 sets - 10 reps  - Seated Forearm Pronation and Supination AROM  - 3 x daily - 7 x weekly - 1 sets - 10 reps  - Seated Elbow Flexion and Extension AROM  - 3 x daily - 7 x weekly - 1 sets - 10 reps

## 2024-06-25 ENCOUNTER — OFFICE VISIT (OUTPATIENT)
Dept: PHYSICAL THERAPY | Facility: CLINIC | Age: 39
End: 2024-06-25
Payer: COMMERCIAL

## 2024-06-25 DIAGNOSIS — Z47.89 SURGICAL AFTERCARE, MUSCULOSKELETAL SYSTEM: ICD-10-CM

## 2024-06-25 DIAGNOSIS — M75.01 ADHESIVE BURSITIS OF RIGHT SHOULDER: Primary | ICD-10-CM

## 2024-06-25 PROCEDURE — 97140 MANUAL THERAPY 1/> REGIONS: CPT

## 2024-06-25 PROCEDURE — 97112 NEUROMUSCULAR REEDUCATION: CPT

## 2024-06-25 PROCEDURE — 97110 THERAPEUTIC EXERCISES: CPT

## 2024-06-25 NOTE — PROGRESS NOTES
Daily Note     Today's date: 2024  Patient name: Cele Adrian  : 1985  MRN: [id number]  Referring provider: Tegan Sabillon PA-C  Dx:   Encounter Diagnosis     ICD-10-CM    1. Adhesive bursitis of right shoulder  M75.01       2. Surgical aftercare, musculoskeletal system  Z47.89                      Subjective: Patient notes R shoulder is a little sore from swimming yesterday.      Objective: See treatment diary below      Assessment: Tolerated treatment well. patient had significant crepitus through PROM- especially abduction. Although, nonpainful this did cause moderate apprehension and muscle guarding. Patient required moderate verbal cues to perform exercises with appropriate technique and intensity. Patient would benefit from continued PT to increase R shoulder ROM and strength for improved function in ADLs.      Plan: Continue per plan of care.      Precautions: Asthma/ DOS 24     Daily Treatment Diary:      Initial Evaluation Date: 24  Compliance    Visit Number 11 12    5  6  7  8  9  10   Re-Eval                    Foto Captured            Y                 6  6  6   Manual                   Shld PROM/ stretch 20 min 15m    20 min  20 min  20 min  15 min  15 min  15 min   GH distraction/ caspular mob 5 min STM 5 min   distraction 3 min  5 min A/ P glides  5 min A/P glides  R UT gentle stm 5m  R UT gentle 5m  R UT gentle 5m                      Ther-Ex                   UBE 3'/3' 3'/3'      2'/2' 3'/3'    Pendulums- CW/CCW/Flex/ Ext x10    Seated 1 min ea Seated 1 min ea Seated 1 min ea Seated 1 min ea     Pulleys 5 min 4 min    2 min ea flx/scap  2 min  2 minx2  2  min ea  2  min ea  3 min   Ball roll on table p-ball CW/ Flex/ scap 1 min ea p-ball CW/ Flex/ scap 1 min ea    p-ball CW/ Flex/ Scaption 1 min ea  p-ball CW/ Flex/ scap 1 min ea  p-ball CW/Flex/ scap 1 min ea  p-ball CW/Flex/ scap 1 min ea   " p-ball CW/Flex/ scap 1 min ea  p-ball CW/ Flex/ scap 1 min ea   Self assist hand held flex 5\" 2x10    nv 5\" x10 supine 5\" x10 standing 5\" x10 standing 5\" x10 standing 5\" x10   Wand flexion 2# stand flex/ scap x10 3# stand flex/ scap x10   Stand 10x flex/scap Stand flex/ scap 10x Stand flex/ scap x10 Stand flex/ scap x10 Stand flex/ scap x10 2# stand flex/ scap x10   Finger ladder 5\" x10 flex/ scap 5\"x10 flex/scap    5\"x10 flx/scap  5\" [id number]/ scap  5\" x10 flex  5\"x10 flex/ scap  5\"x10 flex/ scap  5\" x10 flex/ scap   Seated table slides- flex/ abd supine chest press 2# x15 supine chest press 3# x15            supine chest press 1# wand 15x  supine chest press 2# x15   Wand ER/ towel IR  5\"x10 ea    5\"x5 ea manual             Digi  X10      green 30x  D/C           Wrist flex/ext x10     1# 20x  D/C           Sup / pronation x10     1# 20x  D/C           Bicep curl x10     1# 20x  1# 20x  1# 20x 2# 20x  2# 20x  3# 20x   TB PRE for shld GTB IR/ER/Ext/ Punch x15 GTB IR/ER/Ext/ Punch x20      red ext x10 YTB- IR/ER x15  YTB- IR/ER x15  YTB- IR/ER x15  green IR/ ER/ punch x15   Neuro Re-Ed                   No money      seated tactile cues x15 OTB  seated tactiles x15 OTB           B Straight Arm Extension  GTB x15 GTB 20x     YTB x10 YTB 15x YTB 15x GTB x15                Active scap retraction     Tactile cues 15x -       Scap ret against wall 5\" x15 5\"x15   5\"x15 X15 5\" X15 5\" 5\"X15 5\"X15 5\" x15                Ther-Act                                                            Modalities                   CP 10 min seated 10 min seated   def 10 min supine 10 min seated 10 min seated 10 min seated 10 min seated                                           Access Code: [email or web address]  URL: [email or web address]  Date: 05/30/2024  Prepared by: Beryl Jackson    Exercises  - Circular Shoulder Pendulum with Table Support  - 3 x daily - 7 x weekly - 2 sets - 10 reps  - Flexion-Extension Shoulder Pendulum " with Table Support  - 3 x daily - 7 x weekly - 2 sets - 10 reps  - Seated Shoulder Flexion Towel Slide at Table Top  - 3 x daily - 7 x weekly - 2 sets - 10 reps  - Seated Shoulder Abduction Towel Slide at Table Top  - 3 x daily - 7 x weekly - 2 sets - 10 reps  - Seated Gripping Towel  - 3 x daily - 7 x weekly - 1 sets - 10 reps  - Wrist AROM Flexion Extension  - 3 x daily - 7 x weekly - 1 sets - 10 reps  - Seated Forearm Pronation and Supination AROM  - 3 x daily - 7 x weekly - 1 sets - 10 reps  - Seated Elbow Flexion and Extension AROM  - 3 x daily - 7 x weekly - 1 sets - 10 reps

## 2024-06-26 NOTE — PROGRESS NOTES
PT Evaluation     Today's date: 2024  Patient name: Cele Adrian  : 1985  MRN: [id number]  Referring provider: Tegan Sabillon PA-C  Dx:   Encounter Diagnosis     ICD-10-CM    1. Adhesive bursitis of right shoulder  M75.01       2. Surgical aftercare, musculoskeletal system  Z47.89                        Assessment  Impairments: abnormal or restricted ROM, impaired physical strength, lacks appropriate home exercise program, pain with function and poor posture     Assessment details: Patient is 39 y.o. female who presents to PT with a 10 month hx of right shoulder pain and limited motion. She underwent an arthroscopy yesterday with Dr. Mcrae for capsular release and SAD. Patient demonstrates limited AROM of the shoulder in all motions. She is does demonstrate muscular guard with all PROM/ AAROM this date. She was educated with a HEP to be completed 3 x daily including pendulums/ table slides. Patient is scheduled to be treated daily for the next week but is unable to attend treatment daily the week of  due to other appointments.     UDPATE 24  Patient is progressing toward all goals with decreased pain and increased independence with ADLs. Patient is responding well to treatment with increased AROM/PROM of the right shoulder. Will plan to d/c to HEP in 1 month.  Understanding of Dx/Px/POC: good     Prognosis: good    Goals  STG - 4 weeks  Patient to report decreased pain to 5/10 with function of the R shoulder.  Patient able to complete self dressing tasks of the upper body without assistance.  Patient able to complete hygiene tasks without assistance.  Patient to have increased R shoulder AROM to 120 into flexion.    LTG - 12 weeks  Patient able to demonstrate strength of the R shoulder into flexion/ abduction to 4/5.  Patient to demonstrate increased strength of the R shoulder into ER to 4/5.  Patient able to demonstrate R shoulder AROM to WNL for completion of functional  tasks.  Patient to note pain at worst with activity to be 2/10 SPR.  Patient to demonstrate posture in sitting and standing WNL.  Patient to be d/c to final/ revised HEP.     Plan  Patient would benefit from: PT eval  Planned modality interventions: cryotherapy, electrical stimulation/Russian stimulation, TENS and thermotherapy: hydrocollator packs    Planned therapy interventions: manual therapy, neuromuscular re-education, therapeutic activities, therapeutic exercise, strengthening, postural training, home exercise program and functional ROM exercises    Frequency: 5x weekly x2 weeks/ 3x weekly x4 weeks.  Duration in weeks: 5  Plan of Care beginning date: 2024  Plan of Care expiration date: 2024  Treatment plan discussed with: patient  Plan details: Patient's evaluation is completed. Patient was instructed with a HEP this date. Patient has scheduled further PT sessions for treatment.          Subjective Evaluation    History of Present Illness  Mechanism of injury: Patient notes onset of pain 2023 - she had increasing difficulty with AROM. She did have an injection in the shoulder that was not helpful. She had surgery yesterday with Dr. Mcrae at [facility]. She notes difficulty sleeping last night in a reclining chair. She is right hand dominant. She normally enjoys walking her dog but is unable to to do so.     UPDATE 24  Patient notes that she was cheering at a baseball game yesterday when she jerked her arm resulting in momentary pain of the shoulder. Pain is working to improve functional mobility of the right shoulder.   Patient Goals  Patient goals for therapy: decreased pain, increased strength, independence with ADLs/IADLs and increased motion    Pain  Current pain ratin  At best pain ratin  At worst pain ratin  Quality: throbbing and dull ache  Relieving factors: ice    Social Support  Lives in: multiple-level home    Employment status: working (babysit/ dog  sit)  Hand dominance: right    Treatments  Previous treatment: injection treatment      Objective     Active Range of Motion     Right Shoulder   Flexion: 105 degrees   Abduction: 50 degrees   External rotation 0°: 10 degrees   Internal rotation 0°: 35 degrees     Passive Range of Motion   Left Shoulder   Normal passive range of motion    Right Shoulder   Flexion: 112 degrees   Extension: 26 degrees   Abduction: 100 degrees   External rotation 45°: 10 degrees   Internal rotation 45°: 55 degrees     Strength/Myotome Testing     Left Shoulder   Normal muscle strength    Right Shoulder     Planes of Motion   Flexion: 2-   Extension: 3-   Abduction: 2-   Adduction: 2   External rotation at 0°: 2-   Internal rotation at 0°: 2-     General Comments:      Shoulder Comments   Patient is very guarded with all PROM/AAROM tasks. With verbal and tactile cues she did improve.             Precautions: Asthma/ DOS 5/29/24     Daily Treatment Diary:      Initial Evaluation Date: 05/30/24  Compliance 6/21 6/25 6/27 6/5 6/6 6/7 6/11 6/14 6/18   Visit Number 11 12 13   5  6  7  8  9  10   Re-Eval                    Foto Captured            Y              6/21 6/25 6/27 6/5 6/6 6/7 6/11 6/14 6/18   Manual                   Shld PROM/ stretch 20 min 15m 15 min   20 min  20 min  20 min  15 min  15 min  15 min   GH distraction/ caspular mob 5 min STM 5 min 5 min mob  distraction 3 min  5 min A/ P glides  5 min A/P glides  R UT gentle stm 5m  R UT gentle 5m  R UT gentle 5m                      Ther-Ex                   UBE 3'/3' 3'/3' 3'/3'     2'/2' 3'/3'    Pendulums- CW/CCW/Flex/ Ext x10    Seated 1 min ea Seated 1 min ea Seated 1 min ea Seated 1 min ea     Pulleys 5 min 4 min 4 min   2 min ea flx/scap  2 min  2 minx2  2  min ea  2  min ea  3 min   Ball roll on table p-ball CW/ Flex/ scap 1 min ea p-ball CW/ Flex/ scap 1 min ea P-ball up wall x10   p-ball CW/ Flex/ Scaption 1 min ea  p-ball CW/ Flex/ scap 1 min ea  p-ball  "CW/Flex/ scap 1 min ea  p-ball CW/Flex/ scap 1 min ea   p-ball CW/Flex/ scap 1 min ea  p-ball CW/ Flex/ scap 1 min ea   Self assist hand held flex 5\" 2x10  5\" 2x10  nv 5\" x10 supine 5\" x10 standing 5\" x10 standing 5\" x10 standing 5\" x10   Wand flexion 2# stand flex/ scap x10 3# stand flex/ scap x10 3# 2x10  Stand 10x flex/scap Stand flex/ scap 10x Stand flex/ scap x10 Stand flex/ scap x10 Stand flex/ scap x10 2# stand flex/ scap x10   Finger ladder 5\" x10 flex/ scap 5\"x10 flex/scap 5\" x10   5\"x10 flx/scap  5\" [id number]/ scap  5\" x10 flex  5\"x10 flex/ scap  5\"x10 flex/ scap  5\" x10 flex/ scap   Seated table slides- flex/ abd supine chest press 2# x15 supine chest press 3# x15 Supine chest press 3# x20           supine chest press 1# wand 15x  supine chest press 2# x15   Wand ER/ towel IR  5\"x10 ea 5\" x10   5\"x5 ea manual             Digi    Wall lesia x10   green 30x  D/C           Wrist flex/ext   CW/CCW weight ball roll x10   1# 20x  D/C           Sup / pronation x10     1# 20x  D/C           Bicep curl x10  3# x20   1# 20x  1# 20x  1# 20x 2# 20x  2# 20x  3# 20x   TB PRE for shld GTB IR/ER/Ext/ Punch x15 GTB IR/ER/Ext/ Punch x20 GTB x20     red ext x10 YTB- IR/ER x15  YTB- IR/ER x15  YTB- IR/ER x15  green IR/ ER/ punch x15   Neuro Re-Ed                   No money   Robberies yellow x10 mod cues   seated tactile cues x15 OTB  seated tactiles x15 OTB           B Straight Arm Extension  GTB x15 GTB 20x GTB 20x    YTB x10 YTB 15x YTB 15x GTB x15                Active scap retraction     Tactile cues 15x -       Scap ret against wall 5\" x15 5\"x15 5\" x15  5\"x15 X15 5\" X15 5\" 5\"X15 5\"X15 5\" x15                Ther-Act                                                            Modalities                   CP 10 min seated 10 min seated declined  def 10 min supine 10 min seated 10 min seated 10 min seated 10 min seated                                             Access Code: [email or web address]  URL: " [email or web address]  Date: 05/30/2024  Prepared by: Beryl Jackson    Exercises  - Circular Shoulder Pendulum with Table Support  - 3 x daily - 7 x weekly - 2 sets - 10 reps  - Flexion-Extension Shoulder Pendulum with Table Support  - 3 x daily - 7 x weekly - 2 sets - 10 reps  - Seated Shoulder Flexion Towel Slide at Table Top  - 3 x daily - 7 x weekly - 2 sets - 10 reps  - Seated Shoulder Abduction Towel Slide at Table Top  - 3 x daily - 7 x weekly - 2 sets - 10 reps  - Seated Gripping Towel  - 3 x daily - 7 x weekly - 1 sets - 10 reps  - Wrist AROM Flexion Extension  - 3 x daily - 7 x weekly - 1 sets - 10 reps  - Seated Forearm Pronation and Supination AROM  - 3 x daily - 7 x weekly - 1 sets - 10 reps  - Seated Elbow Flexion and Extension AROM  - 3 x daily - 7 x weekly - 1 sets - 10 reps

## 2024-06-27 ENCOUNTER — EVALUATION (OUTPATIENT)
Dept: PHYSICAL THERAPY | Facility: CLINIC | Age: 39
End: 2024-06-27
Payer: COMMERCIAL

## 2024-06-27 DIAGNOSIS — M75.01 ADHESIVE BURSITIS OF RIGHT SHOULDER: Primary | ICD-10-CM

## 2024-06-27 DIAGNOSIS — Z47.89 SURGICAL AFTERCARE, MUSCULOSKELETAL SYSTEM: ICD-10-CM

## 2024-06-27 PROCEDURE — 97140 MANUAL THERAPY 1/> REGIONS: CPT | Performed by: PHYSICAL THERAPIST

## 2024-06-27 PROCEDURE — 97110 THERAPEUTIC EXERCISES: CPT | Performed by: PHYSICAL THERAPIST

## 2024-06-28 ENCOUNTER — APPOINTMENT (OUTPATIENT)
Dept: PHYSICAL THERAPY | Facility: CLINIC | Age: 39
End: 2024-06-28
Payer: COMMERCIAL

## 2024-07-02 ENCOUNTER — OFFICE VISIT (OUTPATIENT)
Dept: PHYSICAL THERAPY | Facility: CLINIC | Age: 39
End: 2024-07-02
Payer: COMMERCIAL

## 2024-07-02 DIAGNOSIS — M75.01 ADHESIVE BURSITIS OF RIGHT SHOULDER: Primary | ICD-10-CM

## 2024-07-02 DIAGNOSIS — Z47.89 SURGICAL AFTERCARE, MUSCULOSKELETAL SYSTEM: ICD-10-CM

## 2024-07-02 PROCEDURE — 97140 MANUAL THERAPY 1/> REGIONS: CPT

## 2024-07-02 PROCEDURE — 97110 THERAPEUTIC EXERCISES: CPT

## 2024-07-02 PROCEDURE — 97112 NEUROMUSCULAR REEDUCATION: CPT

## 2024-07-02 NOTE — PROGRESS NOTES
"Daily Note     Today's date: 2024  Patient name: Cele Adrian  : 1985  MRN: [id number]  Referring provider: Tegan Sabillon PA-C  Dx:   Encounter Diagnosis     ICD-10-CM    1. Adhesive bursitis of right shoulder  M75.01       2. Surgical aftercare, musculoskeletal system  Z47.89                      Subjective: Patient reports R shoulder has been feeling good.      Objective: See treatment diary below      Assessment: Tolerated treatment well without complaint. Patient required moderate verbal cues to perform exercises with appropriate technique and intensity. Patient would benefit from continued PT to increase R shoulder strength and ROM for improved function in ADLs.      Plan: Continue per plan of care.      Precautions: Asthma/ DOS 24     Daily Treatment Diary:      Initial Evaluation Date: 24  Compliance    Visit Number 11 12 13 14    7  8  9  10   Re-Eval                  Foto Captured          Y                 Manual                 Shld PROM/ stretch 20 min 15m 15 min 15m    20 min  15 min  15 min  15 min   GH distraction/ caspular mob 5 min STM 5 min 5 min mob     5 min A/P glides  R UT gentle stm 5m  R UT gentle 5m  R UT gentle 5m                    Ther-Ex                 UBE 3'/3' 3'/3' 3'/3' 3'/3'    2'/2' 3'/3'    Pendulums- CW/CCW/Flex/ Ext x10      Seated 1 min ea Seated 1 min ea     Pulleys 5 min 4 min 4 min 3 min ea    2 minx2  2  min ea  2  min ea  3 min   Ball roll on table p-ball CW/ Flex/ scap 1 min ea p-ball CW/ Flex/ scap 1 min ea P-ball up wall x10 P-ball up wall x10    p-ball CW/Flex/ scap 1 min ea  p-ball CW/Flex/ scap 1 min ea   p-ball CW/Flex/ scap 1 min ea  p-ball CW/ Flex/ scap 1 min ea   Self assist hand held flex 5\" 2x10  5\" 2x10 5\"x20   5\" x10 standing 5\" x10 standing 5\" x10 standing 5\" x10   Wand flexion 2# stand flex/ scap x10 3# stand flex/ scap x10 3# 2x10 3# 2x10 " "  Stand flex/ scap x10 Stand flex/ scap x10 Stand flex/ scap x10 2# stand flex/ scap x10   Finger ladder 5\" x10 flex/ scap 5\"x10 flex/scap 5\" x10 -    5\" x10 flex  5\"x10 flex/ scap  5\"x10 flex/ scap  5\" x10 flex/ scap   Seated table slides- flex/ abd supine chest press 2# x15 supine chest press 3# x15 Supine chest press 3# x20 Supine chest press 3# x20        supine chest press 1# wand 15x  supine chest press 2# x15   Wand ER/ towel IR  5\"x10 ea 5\" x10 5\"x10             Digi    Wall lesia x10 Wall lesia x10             Wrist flex/ext   CW/CCW weight ball roll x10 CW/CCW weight ball roll x10             Sup / pronation x10                Bicep curl x10  3# x20 3# 20x    1# 20x 2# 20x  2# 20x  3# 20x   TB PRE for shld GTB IR/ER/Ext/ Punch x15 GTB IR/ER/Ext/ Punch x20 GTB x20 GTB 20x   YTB- IR/ER x15  YTB- IR/ER x15  YTB- IR/ER x15  green IR/ ER/ punch x15   Neuro Re-Ed                 No money   Robberies yellow x10 mod cues Robberies yellow x10 mod cues             B Straight Arm Extension  GTB x15 GTB 20x GTB 20x GTB 20x   YTB x10 YTB 15x YTB 15x GTB x15                Active scap retraction             Scap ret against wall 5\" x15 5\"x15 5\" x15 5\"x15   X15 5\" 5\"X15 5\"X15 5\" x15                Ther-Act                                                          Modalities                 CP 10 min seated 10 min seated declined    10 min seated 10 min seated 10 min seated 10 min seated                                             Access Code: [email or web address]  URL: [email or web address]  Date: 05/30/2024  Prepared by: Beryl Jackson    Exercises  - Circular Shoulder Pendulum with Table Support  - 3 x daily - 7 x weekly - 2 sets - 10 reps  - Flexion-Extension Shoulder Pendulum with Table Support  - 3 x daily - 7 x weekly - 2 sets - 10 reps  - Seated Shoulder Flexion Towel Slide at Table Top  - 3 x daily - 7 x weekly - 2 sets - 10 reps  - Seated Shoulder Abduction Towel Slide at Table Top  - 3 x daily - 7 x " weekly - 2 sets - 10 reps  - Seated Gripping Towel  - 3 x daily - 7 x weekly - 1 sets - 10 reps  - Wrist AROM Flexion Extension  - 3 x daily - 7 x weekly - 1 sets - 10 reps  - Seated Forearm Pronation and Supination AROM  - 3 x daily - 7 x weekly - 1 sets - 10 reps  - Seated Elbow Flexion and Extension AROM  - 3 x daily - 7 x weekly - 1 sets - 10 reps

## 2024-07-03 ENCOUNTER — OFFICE VISIT (OUTPATIENT)
Dept: PHYSICAL THERAPY | Facility: CLINIC | Age: 39
End: 2024-07-03
Payer: COMMERCIAL

## 2024-07-03 DIAGNOSIS — M75.01 ADHESIVE BURSITIS OF RIGHT SHOULDER: Primary | ICD-10-CM

## 2024-07-03 DIAGNOSIS — Z47.89 SURGICAL AFTERCARE, MUSCULOSKELETAL SYSTEM: ICD-10-CM

## 2024-07-03 PROCEDURE — 97110 THERAPEUTIC EXERCISES: CPT | Performed by: PHYSICAL THERAPIST

## 2024-07-03 PROCEDURE — 97140 MANUAL THERAPY 1/> REGIONS: CPT | Performed by: PHYSICAL THERAPIST

## 2024-07-03 NOTE — PROGRESS NOTES
"Daily Note     Today's date: 7/3/2024  Patient name: Cele Adrian  : 1985  MRN: [id number]  Referring provider: Tegan Sabillon PA-C  Dx:   Encounter Diagnosis     ICD-10-CM    1. Adhesive bursitis of right shoulder  M75.01       2. Surgical aftercare, musculoskeletal system  Z47.89                      Subjective: Patient notes she experienced a twinge in the shoulder last evening while at rest. She notes no pain currently.       Objective: See treatment diary below      Assessment: Tolerated treatment well. Patient would benefit from continued PT. Patient continues to work to improve shoulder PROM/AROM. She does lack motion in all directions with progression slowly toward goals. Patient is encouraged to increase activity level with the shoulder to improve motion/ strength.      Plan: Continue per plan of care.      Precautions: Asthma/ DOS 24     Daily Treatment Diary:      Initial Evaluation Date: 24  Compliance  7 7/3   6/7  6/11  6/14  6/18   Visit Number 11 12 13 14 15   7  8  9  10   Re-Eval                  Foto Captured          Y               7/2 7/3   6   Manual                 Shld PROM/ stretch 20 min 15m 15 min 15m 15 min   20 min  15 min  15 min  15 min   GH distraction/ caspular mob 5 min STM 5 min 5 min mob     5 min A/P glides  R UT gentle stm 5m  R UT gentle 5m  R UT gentle 5m                    Ther-Ex                 UBE 3'/3' 3'/3' 3'/3' 3'/3' 3'/3'   2'/2' 3'/3'    Pendulums- CW/CCW/Flex/ Ext x10      Seated 1 min ea Seated 1 min ea     Pulleys 5 min 4 min 4 min 3 min ea 4 min   2 minx2  2  min ea  2  min ea  3 min   Ball roll on table p-ball CW/ Flex/ scap 1 min ea p-ball CW/ Flex/ scap 1 min ea P-ball up wall x10 P-ball up wall x10 P-ball up wall x10   p-ball CW/Flex/ scap 1 min ea  p-ball CW/Flex/ scap 1 min ea   p-ball CW/Flex/ scap 1 min ea  p-ball CW/ Flex/ scap 1 min ea   Self assist hand held flex 5\" 2x10  5\" " "2x10 5\"x20 5\" x20  5\" x10 standing 5\" x10 standing 5\" x10 standing 5\" x10   Wand flexion 2# stand flex/ scap x10 3# stand flex/ scap x10 3# 2x10 3# 2x10 3# 2x10  Stand flex/ scap x10 Stand flex/ scap x10 Stand flex/ scap x10 2# stand flex/ scap x10   Finger ladder 5\" x10 flex/ scap 5\"x10 flex/scap 5\" x10 - 5\"x10   5\" x10 flex  5\"x10 flex/ scap  5\"x10 flex/ scap  5\" x10 flex/ scap   Seated table slides- flex/ abd supine chest press 2# x15 supine chest press 3# x15 Supine chest press 3# x20 Supine chest press 3# x20 Supine chest press 3# x20       supine chest press 1# wand 15x  supine chest press 2# x15   Wand ER/ towel IR  5\"x10 ea 5\" x10 5\"x10 5\" x10            Digi    Wall lesia x10 Wall lesia x10 Wall lesia x10            Wrist flex/ext   CW/CCW weight ball roll x10 CW/CCW weight ball roll x10 CW/CCW x10            Sup / pronation x10                Bicep curl x10  3# x20 3# 20x 3# 20x   1# 20x 2# 20x  2# 20x  3# 20x   TB PRE for shld GTB IR/ER/Ext/ Punch x15 GTB IR/ER/Ext/ Punch x20 GTB x20 GTB 20x GTB 20x  YTB- IR/ER x15  YTB- IR/ER x15  YTB- IR/ER x15  green IR/ ER/ punch x15   Neuro Re-Ed                 No money   Robberies yellow x10 mod cues Robberies yellow x10 mod cues Robberies yellow x10            B Straight Arm Extension  GTB x15 GTB 20x GTB 20x GTB 20x GTB 20x  YTB x10 YTB 15x YTB 15x GTB x15                Active scap retraction             Scap ret against wall 5\" x15 5\"x15 5\" x15 5\"x15 5\" x15  X15 5\" 5\"X15 5\"X15 5\" x15                Ther-Act                                                          Modalities                 CP 10 min seated 10 min seated declined    10 min seated 10 min seated 10 min seated 10 min seated                                             Access Code: [email or web address]  URL: [email or web address]  Date: 05/30/2024  Prepared by: Beryl Jackson    Exercises  - Circular Shoulder Pendulum with Table Support  - 3 x daily - 7 x weekly - 2 sets - 10 reps  - " Flexion-Extension Shoulder Pendulum with Table Support  - 3 x daily - 7 x weekly - 2 sets - 10 reps  - Seated Shoulder Flexion Towel Slide at Table Top  - 3 x daily - 7 x weekly - 2 sets - 10 reps  - Seated Shoulder Abduction Towel Slide at Table Top  - 3 x daily - 7 x weekly - 2 sets - 10 reps  - Seated Gripping Towel  - 3 x daily - 7 x weekly - 1 sets - 10 reps  - Wrist AROM Flexion Extension  - 3 x daily - 7 x weekly - 1 sets - 10 reps  - Seated Forearm Pronation and Supination AROM  - 3 x daily - 7 x weekly - 1 sets - 10 reps  - Seated Elbow Flexion and Extension AROM  - 3 x daily - 7 x weekly - 1 sets - 10 reps

## 2024-07-05 ENCOUNTER — APPOINTMENT (OUTPATIENT)
Dept: PHYSICAL THERAPY | Facility: CLINIC | Age: 39
End: 2024-07-05
Payer: COMMERCIAL

## 2024-07-09 ENCOUNTER — OFFICE VISIT (OUTPATIENT)
Dept: PHYSICAL THERAPY | Facility: CLINIC | Age: 39
End: 2024-07-09
Payer: COMMERCIAL

## 2024-07-09 DIAGNOSIS — M75.01 ADHESIVE BURSITIS OF RIGHT SHOULDER: Primary | ICD-10-CM

## 2024-07-09 DIAGNOSIS — Z47.89 SURGICAL AFTERCARE, MUSCULOSKELETAL SYSTEM: ICD-10-CM

## 2024-07-09 PROCEDURE — 97140 MANUAL THERAPY 1/> REGIONS: CPT

## 2024-07-09 PROCEDURE — 97110 THERAPEUTIC EXERCISES: CPT

## 2024-07-09 NOTE — PROGRESS NOTES
"Daily Note     Today's date: 2024  Patient name: Cele Adrian  : 1985  MRN: [id number]  Referring provider: Tegan Sabillon PA-C  Dx:   Encounter Diagnosis     ICD-10-CM    1. Adhesive bursitis of right shoulder  M75.01       2. Surgical aftercare, musculoskeletal system  Z47.89                      Subjective: Patient reports R shoulder is feeling excellent.       Objective: See treatment diary below      Assessment: Tolerated treatment well without complaint. Patient required moderate verbal cues to perform exercises with appropriate technique and intensity. Patient would benefit from continued PT to R shoulder strength and ROM for improved function in ADLs.      Plan: Continue per plan of care.      Precautions: Asthma/ DOS 24     Daily Treatment Diary:      Initial Evaluation Date: 24  Compliance  7/2 7/3 7   Visit Number 11 12 13 14 15 16    9  10   Re-Eval                Foto Captured                      /2 7/3 7/9    6/14  6/18   Manual               Shld PROM/ stretch 20 min 15m 15 min 15m 15 min 15 min    15 min  15 min   GH distraction/ caspular mob 5 min STM 5 min 5 min mob       R UT gentle 5m  R UT gentle 5m                  Ther-Ex               UBE 3'/3' 3'/3' 3'/3' 3'/3' 3'/3' 3'/3'   3'/3'    Pendulums- CW/CCW/Flex/ Ext x10            Pulleys 5 min 4 min 4 min 3 min ea 4 min 4 min    2  min ea  3 min   Ball roll on table p-ball CW/ Flex/ scap 1 min ea p-ball CW/ Flex/ scap 1 min ea P-ball up wall x10 P-ball up wall x10 P-ball up wall x10 Pball roll up 15x     p-ball CW/Flex/ scap 1 min ea  p-ball CW/ Flex/ scap 1 min ea   Self assist hand held flex 5\" 2x10  5\" 2x10 5\"x20 5\" x20 5\"x20   5\" x10 standing 5\" x10   Wand flexion 2# stand flex/ scap x10 3# stand flex/ scap x10 3# 2x10 3# 2x10 3# 2x10 3# 2x10   Stand flex/ scap x10 2# stand flex/ scap x10   Finger ladder 5\" x10 flex/ scap 5\"x10 flex/scap 5\" x10 - 5\"x10 5\"x10    " "5\"x10 flex/ scap  5\" x10 flex/ scap   Seated table slides- flex/ abd supine chest press 2# x15 supine chest press 3# x15 Supine chest press 3# x20 Supine chest press 3# x20 Supine chest press 3# x20 Chest press 3# 2x10    supine chest press 1# wand 15x  supine chest press 2# x15   Wand ER/ towel IR  5\"x10 ea 5\" x10 5\"x10 5\" x10 5\"x10         Digi    Wall lesia x10 Wall lesia x10 Wall lesia x10 Wall lesia x10         Wrist flex/ext   CW/CCW weight ball roll x10 CW/CCW weight ball roll x10 CW/CCW x10 Red cw/ccw 10x         Sup / pronation x10              Bicep curl x10  3# x20 3# 20x 3# 20x 4# 20x    2# 20x  3# 20x   TB PRE for shld GTB IR/ER/Ext/ Punch x15 GTB IR/ER/Ext/ Punch x20 GTB x20 GTB 20x GTB 20x GTB 20x    YTB- IR/ER x15  green IR/ ER/ punch x15   Neuro Re-Ed               No money   Robberies yellow x10 mod cues Robberies yellow x10 mod cues Robberies yellow x10 Robberies yellow x10         B Straight Arm Extension  GTB x15 GTB 20x GTB 20x GTB 20x GTB 20x GTB 20x   YTB 15x GTB x15                Active scap retraction             Scap ret against wall 5\" x15 5\"x15 5\" x15 5\"x15 5\" x15 5\"x15   5\"X15 5\" x15                Ther-Act                                                        Modalities               CP 10 min seated 10 min seated declined      10 min seated 10 min seated                                          Access Code: [email or web address]  URL: [email or web address]  Date: 05/30/2024  Prepared by: Beryl Jackson    Exercises  - Circular Shoulder Pendulum with Table Support  - 3 x daily - 7 x weekly - 2 sets - 10 reps  - Flexion-Extension Shoulder Pendulum with Table Support  - 3 x daily - 7 x weekly - 2 sets - 10 reps  - Seated Shoulder Flexion Towel Slide at Table Top  - 3 x daily - 7 x weekly - 2 sets - 10 reps  - Seated Shoulder Abduction Towel Slide at Table Top  - 3 x daily - 7 x weekly - 2 sets - 10 reps  - Seated Gripping Towel  - 3 x daily - 7 x weekly - 1 sets - 10 reps  - " Wrist AROM Flexion Extension  - 3 x daily - 7 x weekly - 1 sets - 10 reps  - Seated Forearm Pronation and Supination AROM  - 3 x daily - 7 x weekly - 1 sets - 10 reps  - Seated Elbow Flexion and Extension AROM  - 3 x daily - 7 x weekly - 1 sets - 10 reps

## 2024-07-11 ENCOUNTER — OFFICE VISIT (OUTPATIENT)
Dept: PHYSICAL THERAPY | Facility: CLINIC | Age: 39
End: 2024-07-11
Payer: COMMERCIAL

## 2024-07-11 DIAGNOSIS — M75.01 ADHESIVE BURSITIS OF RIGHT SHOULDER: Primary | ICD-10-CM

## 2024-07-11 DIAGNOSIS — Z47.89 SURGICAL AFTERCARE, MUSCULOSKELETAL SYSTEM: ICD-10-CM

## 2024-07-11 PROCEDURE — 97140 MANUAL THERAPY 1/> REGIONS: CPT | Performed by: PHYSICAL THERAPIST

## 2024-07-11 PROCEDURE — 97110 THERAPEUTIC EXERCISES: CPT | Performed by: PHYSICAL THERAPIST

## 2024-07-11 NOTE — PROGRESS NOTES
Daily Note     Today's date: 2024  Patient name: Cele Adrian  : 1985  MRN: [id number]  Referring provider: Tegan Sabillon PA-C  Dx:   Encounter Diagnosis     ICD-10-CM    1. Adhesive bursitis of right shoulder  M75.01       2. Surgical aftercare, musculoskeletal system  Z47.89                      Subjective: Patient notes increased shoulder pain during the night last night which she feels is due to OA flare with the hot humid weather.       Objective: See treatment diary below      Assessment: Tolerated treatment well. Patient would benefit from continued PT. Patient was instructed with use of pulleys for home use. She was able to complete treatment with increased rest breaks due to pain with stretching this date. She is able to flex her shoulder to  degrees however does experience pain into endrange motions. Patient continues to work to improve overall function of the shoulder.       Plan: Continue per plan of care.      Precautions: Asthma/ DOS 24     Daily Treatment Diary:      Initial Evaluation Date: 24  Compliance  7/2 7/3 7   Visit Number 11 12 13 14 15 16 17   9  10   Re-Eval                Foto Captured                       7/2 7/3 7   Manual               Shld PROM/ stretch 20 min 15m 15 min 15m 15 min 15 min 10 min   15 min  15 min   GH distraction/ caspular mob 5 min STM 5 min 5 min mob    5 min   R UT gentle 5m  R UT gentle 5m                  Ther-Ex               UBE 3'/3' 3'/3' 3'/3' 3'/3' 3'/3' 3'/3' 2'/2'  3'/3'    Pendulums- CW/CCW/Flex/ Ext x10            Pulleys 5 min 4 min 4 min 3 min ea 4 min 4 min 4 min   2  min ea  3 min   Ball roll on table p-ball CW/ Flex/ scap 1 min ea p-ball CW/ Flex/ scap 1 min ea P-ball up wall x10 P-ball up wall x10 P-ball up wall x10 Pball roll up 15x Pball up wall 15x    p-ball CW/Flex/ scap 1 min ea  p-ball CW/ Flex/ scap 1 min ea   Self assist hand held  "flex 5\" 2x10  5\" 2x10 5\"x20 5\" x20 5\"x20 5\" x20  5\" x10 standing 5\" x10   Wand flexion 2# stand flex/ scap x10 3# stand flex/ scap x10 3# 2x10 3# 2x10 3# 2x10 3# 2x10 3# 2x10  Stand flex/ scap x10 2# stand flex/ scap x10   Finger ladder 5\" x10 flex/ scap 5\"x10 flex/scap 5\" x10 - 5\"x10 5\"x10 5\" x10   5\"x10 flex/ scap  5\" x10 flex/ scap   Seated table slides- flex/ abd supine chest press 2# x15 supine chest press 3# x15 Supine chest press 3# x20 Supine chest press 3# x20 Supine chest press 3# x20 Chest press 3# 2x10 Chest press 3# 2x10   supine chest press 1# wand 15x  supine chest press 2# x15   Wand ER/ towel IR  5\"x10 ea 5\" x10 5\"x10 5\" x10 5\"x10 5\" x10        Digi    Wall lesia x10 Wall lesia x10 Wall lesia x10 Wall lesia x10 Wall angels x10        Wrist flex/ext   CW/CCW weight ball roll x10 CW/CCW weight ball roll x10 CW/CCW x10 Red cw/ccw 10x Red cw/ccw x10        Sup / pronation x10              Bicep curl x10  3# x20 3# 20x 3# 20x 4# 20x 4# 20x   2# 20x  3# 20x   TB PRE for shld GTB IR/ER/Ext/ Punch x15 GTB IR/ER/Ext/ Punch x20 GTB x20 GTB 20x GTB 20x GTB 20x GTB 20x   YTB- IR/ER x15  green IR/ ER/ punch x15   Neuro Re-Ed               No money   Robberies yellow x10 mod cues Robberies yellow x10 mod cues Robberies yellow x10 Robberies yellow x10 -->        B Straight Arm Extension  GTB x15 GTB 20x GTB 20x GTB 20x GTB 20x GTB 20x GTB 20x  YTB 15x GTB x15                Active scap retraction             Scap ret against wall 5\" x15 5\"x15 5\" x15 5\"x15 5\" x15 5\"x15 5\" x15  5\"X15 5\" x15                Ther-Act                                                        Modalities               CP 10 min seated 10 min seated declined      10 min seated 10 min seated                                          Access Code: [email or web address]  URL: [email or web address]  Date: 05/30/2024  Prepared by: Beryl Jackson    Exercises  - Circular Shoulder Pendulum with Table Support  - 3 x daily - 7 x weekly - 2 " sets - 10 reps  - Flexion-Extension Shoulder Pendulum with Table Support  - 3 x daily - 7 x weekly - 2 sets - 10 reps  - Seated Shoulder Flexion Towel Slide at Table Top  - 3 x daily - 7 x weekly - 2 sets - 10 reps  - Seated Shoulder Abduction Towel Slide at Table Top  - 3 x daily - 7 x weekly - 2 sets - 10 reps  - Seated Gripping Towel  - 3 x daily - 7 x weekly - 1 sets - 10 reps  - Wrist AROM Flexion Extension  - 3 x daily - 7 x weekly - 1 sets - 10 reps  - Seated Forearm Pronation and Supination AROM  - 3 x daily - 7 x weekly - 1 sets - 10 reps  - Seated Elbow Flexion and Extension AROM  - 3 x daily - 7 x weekly - 1 sets - 10 reps

## 2024-07-12 ENCOUNTER — APPOINTMENT (OUTPATIENT)
Dept: PHYSICAL THERAPY | Facility: CLINIC | Age: 39
End: 2024-07-12
Payer: COMMERCIAL

## 2024-07-14 PROBLEM — R31.29 MICROHEMATURIA: Status: ACTIVE | Noted: 2024-07-14

## 2024-07-14 NOTE — PROGRESS NOTES
UROLOGY PROGRESS NOTE         NAME: Cele Adrian  AGE: 39 y.o. SEX: female  : 1985   MRN: [id number]    DATE: 2024  TIME: 5:21 PM    Assessment and Plan      Cystoscopy     Date/Time  2024 8:30 AM     Performed by  Hari Allison MD   Authorized by  Hari Allison MD         Procedure Details:  Procedure type: cystoscopy    Additional Procedure Details: Patient was placed in dorsolithotomy position prepped draped in usual sterile fashion using the flexible cystoscope she had a normal anterior posterior urethra and a normal bladder.  Patient tolerated well received Cipro.       Impression:   1. Cyst of left ovary  2. Urge incontinence  3. Female stress incontinence  4. Urinary frequency  5. Microhematuria       Plan: Continue Myrbetriq 50 mg follow-up in 1 year.    Patient understands I am part-time and that it is possible she might see a physician assistant as a follow-up.  She understands and agrees.      Chief Complaint   No chief complaint on file.    History of Present Illness     HPI: Cele Adrian is a 39 y.o. year old female who presents with from office visit 2024 with microscopic hematuria, urinary frequency, urinary urgency urge incontinence and female stress incontinence.    Patient had a CT stone protocol 2024 that showed a 1.4 cm hyperdense left ovarian cyst likely a hemorrhagic cyst.  No other acute pathology was noted.    Urine culture and urine cytology from 2024 was negative.  Later workup with a cystoscopy.  Also the plan at the last visit was to continue Kegel exercises.  I gave her a trial of Gemtesa 75 mg he was to see us in 4 weeks but patient had canceled a few times.  States she is on Myrbetriq 50 mg working well occasional nocturia x 2 but all in all happy with the results.  This did discuss her CT scan report regarding ovarian cyst that she needs to follow-up with her PCP or GYN and she agrees.      The following portions of the patient's history were  reviewed and updated as appropriate: allergies, current medications, past family history, past medical history, past social history, past surgical history and problem list.  Past Medical History:   Diagnosis Date    Anxiety and depression     Asthma     Bipolar disorder, unspecified (HCC)     Cerebral palsy (HCC)     Dyslipidemia     Elevated alkaline phosphatase level     GERD (gastroesophageal reflux disease)     Major depressive disorder     Migraines     Obesity without serious comorbidity     Other allergic rhinitis     Paranoia (HCC)     Prediabetes     PTSD (post-traumatic stress disorder)     Social phobia     Thrombocytosis      Past Surgical History:   Procedure Laterality Date    CHOLECYSTECTOMY      DENTAL SURGERY       shoulder  Review of Systems     Const: Denies chills, fever and weight loss.  CV: Denies chest pain.  Resp: Denies SOB.  GI: Denies abdominal pain, nausea and vomiting.  : Denies symptoms other than stated above.  Musculo: Denies back pain.    Objective   There were no vitals taken for this visit.    Physical Exam  Const: Appears healthy and well developed. No signs of acute distress present.  Resp: Respirations are regular and unlabored.   CV: Rate is regular. Rhythm is regular.  Abdomen: Abdomen is soft, nontender, and nondistended. Kidneys are not palpable.  : nl  Psych: Patient's attitude is cooperative. Mood is normal. Affect is normal.    Current Medications     Current Outpatient Medications:     Acetaminophen 500 MG, Take 500 mg by mouth every 6 (six) hours as needed, Disp: , Rfl:     ARIPiprazole (ABILIFY) 5 mg tablet, Take 5 mg by mouth daily, Disp: , Rfl:     busPIRone (BUSPAR) 5 mg tablet, Take 1 tablet by mouth twice daily for anxiety., Disp: , Rfl:     cetirizine (ZyrTEC) 10 mg tablet, Take 1 tablet by mouth daily as needed, Disp: , Rfl:     clotrimazole-betamethasone (LOTRISONE) 1-0.05 % cream, Apply topically 2 (two) times a day, Disp: , Rfl:     escitalopram (LEXAPRO)  10 mg tablet, Take 1 tablet by mouth daily, Disp: , Rfl:     fluticasone (FLONASE) 50 mcg/act nasal spray, USE TWO (2) SPRAYS IN EACH NOSTRIL ONCE DAILY, Disp: , Rfl:     hydrOXYzine HCL (ATARAX) 25 mg tablet, Take 1 tablet by mouth 2 (two) times a day as needed, Disp: , Rfl:     ibuprofen (MOTRIN) 400 mg tablet, Take 400 mg by mouth every 4 (four) hours as needed, Disp: , Rfl:     melatonin 3 mg, Take 5 mg by mouth, Disp: , Rfl:     Mirabegron ER 50 MG TB24, Take 1 tablet (50 mg total) by mouth in the morning, Disp: 90 tablet, Rfl: 3    montelukast (SINGULAIR) 10 mg tablet, Take 1 tablet by mouth daily, Disp: , Rfl:     naproxen (Naprosyn) 500 mg tablet, Take 1 tablet (500 mg total) by mouth 2 (two) times a day with meals, Disp: 30 tablet, Rfl: 0    norethindrone-ethinyl estradiol (FEMHRT 1/5) 1-5 MG-MCG TABS, Take 1 tablet by mouth daily, Disp: , Rfl:     omeprazole (PriLOSEC) 40 MG capsule, Take 40 mg by mouth daily, Disp: , Rfl:     perphenazine 4 mg tablet, Take 1 tabletby mouth twice a day for 2 weeks stop perphenazine 2mg, Disp: , Rfl:     ProAir  (90 Base) MCG/ACT inhaler, Inhale 2 puffs every 4 (four) hours as needed, Disp: , Rfl:     rosuvastatin (CRESTOR) 20 MG tablet, Take 1 tablet by mouth every morning, Disp: , Rfl:     semaglutide, 0.25 or 0.5 mg/dose, (Ozempic, 0.25 or 0.5 MG/DOSE,) 2 mg/3 mL injection pen, Inject 0.5 mg under the skin, Disp: , Rfl:     topiramate (TOPAMAX) 50 MG tablet, Take 1 tablet by mouth 2 (two) times a day, Disp: , Rfl:     traMADol (ULTRAM) 50 mg tablet, Take 50 mg by mouth, Disp: , Rfl:     venlafaxine (EFFEXOR) 75 mg tablet, Take 75 mg by mouth daily, Disp: , Rfl:     Vibegron 75 MG TABS, Take 75 mg by mouth in the morning, Disp: 90 tablet, Rfl: 3        Hari Allison MD

## 2024-07-15 ENCOUNTER — TELEPHONE (OUTPATIENT)
Age: 39
End: 2024-07-15

## 2024-07-16 ENCOUNTER — APPOINTMENT (OUTPATIENT)
Dept: PHYSICAL THERAPY | Facility: CLINIC | Age: 39
End: 2024-07-16
Payer: COMMERCIAL

## 2024-07-18 ENCOUNTER — OFFICE VISIT (OUTPATIENT)
Dept: PHYSICAL THERAPY | Facility: CLINIC | Age: 39
End: 2024-07-18
Payer: COMMERCIAL

## 2024-07-18 DIAGNOSIS — M75.01 ADHESIVE BURSITIS OF RIGHT SHOULDER: Primary | ICD-10-CM

## 2024-07-18 DIAGNOSIS — Z47.89 SURGICAL AFTERCARE, MUSCULOSKELETAL SYSTEM: ICD-10-CM

## 2024-07-18 PROCEDURE — 97110 THERAPEUTIC EXERCISES: CPT

## 2024-07-18 PROCEDURE — 97140 MANUAL THERAPY 1/> REGIONS: CPT

## 2024-07-18 NOTE — PROGRESS NOTES
"Daily Note     Today's date: 2024  Patient name: Cele Adrian  : 1985  MRN: [id number]  Referring provider: Tegan Sabillon PA-C  Dx:   Encounter Diagnosis     ICD-10-CM    1. Adhesive bursitis of right shoulder  M75.01       2. Surgical aftercare, musculoskeletal system  Z47.89                      Subjective: Patient reports R shoulder has been feeling pretty good.      Objective: See treatment diary below      Assessment: Tolerated treatment well without complaint. Patient required moderate verbal cues to perform exercises with appropriate technique and intensity. Patient would benefit from continued PT to increase R shoulder strength and mobility for improved function in ADLs.      Plan: Continue per plan of care.  Potential DC planning to begin soon.     Precautions: Asthma/ DOS 24     Daily Treatment Diary:      Initial Evaluation Date: 24  Compliance 6/21 6/25 6/27 7/2 7/3 7/9 7/11 7/18     Visit Number 11 12 13 14 15 16 17 18     Re-Eval              Foto Captured                    6/21 6/25 6/27 7/2 7/3 7/9 7/11 7/18     Manual             Shld PROM/ stretch 20 min 15m 15 min 15m 15 min 15 min 10 min 10 min     GH distraction/ caspular mob 5 min STM 5 min 5 min mob    5 min 5 min                  Ther-Ex             UBE 3'/3' 3'/3' 3'/3' 3'/3' 3'/3' 3'/3' 2'/2' 2'/2'     Pendulums- CW/CCW/Flex/ Ext x10            Pulleys 5 min 4 min 4 min 3 min ea 4 min 4 min 4 min -     Ball roll on table p-ball CW/ Flex/ scap 1 min ea p-ball CW/ Flex/ scap 1 min ea P-ball up wall x10 P-ball up wall x10 P-ball up wall x10 Pball roll up 15x Pball up wall 15x Pball up wall 15x     Self assist hand held flex 5\" 2x10  5\" 2x10 5\"x20 5\" x20 5\"x20 5\" x20 5\"x20     Wand flexion 2# stand flex/ scap x10 3# stand flex/ scap x10 3# 2x10 3# 2x10 3# 2x10 3# 2x10 3# 2x10 3# 2x10     Finger ladder 5\" x10 flex/ scap 5\"x10 flex/scap 5\" x10 - 5\"x10 5\"x10 5\" x10 5\"x10     Seated table slides- flex/ abd supine " "chest press 2# x15 supine chest press 3# x15 Supine chest press 3# x20 Supine chest press 3# x20 Supine chest press 3# x20 Chest press 3# 2x10 Chest press 3# 2x10 Chest press 3# 2x10     Wand ER/ towel IR  5\"x10 ea 5\" x10 5\"x10 5\" x10 5\"x10 5\" x10 5\"x10     Digi    Wall lesia x10 Wall lesia x10 Wall lesia x10 Wall lesia x10 Wall angels x10 Wall angels low x10     Wrist flex/ext   CW/CCW weight ball roll x10 CW/CCW weight ball roll x10 CW/CCW x10 Red cw/ccw 10x Red cw/ccw x10 Red cw/ccw     Sup / pronation x10            Bicep curl x10  3# x20 3# 20x 3# 20x 4# 20x 4# 20x 4# 20x     TB PRE for shld GTB IR/ER/Ext/ Punch x15 GTB IR/ER/Ext/ Punch x20 GTB x20 GTB 20x GTB 20x GTB 20x GTB 20x GTB 20x     Neuro Re-Ed             No money   Robberies yellow x10 mod cues Robberies yellow x10 mod cues Robberies yellow x10 Robberies yellow x10 --> Robberies 15x yellow     B Straight Arm Extension  GTB x15 GTB 20x GTB 20x GTB 20x GTB 20x GTB 20x GTB 20x GTB 20x                  Active scap retraction             Scap ret against wall 5\" x15 5\"x15 5\" x15 5\"x15 5\" x15 5\"x15 5\" x15 5\"x15                  Ther-Act                                                      Modalities             CP 10 min seated 10 min seated declined                                                 Access Code: [email or web address]  URL: [email or web address]  Date: 05/30/2024  Prepared by: Beryl Jackson    Exercises  - Circular Shoulder Pendulum with Table Support  - 3 x daily - 7 x weekly - 2 sets - 10 reps  - Flexion-Extension Shoulder Pendulum with Table Support  - 3 x daily - 7 x weekly - 2 sets - 10 reps  - Seated Shoulder Flexion Towel Slide at Table Top  - 3 x daily - 7 x weekly - 2 sets - 10 reps  - Seated Shoulder Abduction Towel Slide at Table Top  - 3 x daily - 7 x weekly - 2 sets - 10 reps  - Seated Gripping Towel  - 3 x daily - 7 x weekly - 1 sets - 10 reps  - Wrist AROM Flexion Extension  - 3 x daily - 7 x weekly - 1 sets - 10 " reps  - Seated Forearm Pronation and Supination AROM  - 3 x daily - 7 x weekly - 1 sets - 10 reps  - Seated Elbow Flexion and Extension AROM  - 3 x daily - 7 x weekly - 1 sets - 10 reps

## 2024-07-19 ENCOUNTER — APPOINTMENT (OUTPATIENT)
Dept: PHYSICAL THERAPY | Facility: CLINIC | Age: 39
End: 2024-07-19
Payer: COMMERCIAL

## 2024-07-23 ENCOUNTER — PROCEDURE VISIT (OUTPATIENT)
Dept: UROLOGY | Facility: CLINIC | Age: 39
End: 2024-07-23
Payer: COMMERCIAL

## 2024-07-23 ENCOUNTER — APPOINTMENT (OUTPATIENT)
Dept: PHYSICAL THERAPY | Facility: CLINIC | Age: 39
End: 2024-07-23
Payer: COMMERCIAL

## 2024-07-23 VITALS
BODY MASS INDEX: 42.86 KG/M2 | HEART RATE: 92 BPM | DIASTOLIC BLOOD PRESSURE: 78 MMHG | OXYGEN SATURATION: 97 % | HEIGHT: 61 IN | TEMPERATURE: 98 F | WEIGHT: 227 LBS | SYSTOLIC BLOOD PRESSURE: 112 MMHG

## 2024-07-23 DIAGNOSIS — N39.41 URGE INCONTINENCE: ICD-10-CM

## 2024-07-23 DIAGNOSIS — N39.3 FEMALE STRESS INCONTINENCE: ICD-10-CM

## 2024-07-23 DIAGNOSIS — N83.202 CYST OF LEFT OVARY: ICD-10-CM

## 2024-07-23 DIAGNOSIS — R35.0 URINARY FREQUENCY: ICD-10-CM

## 2024-07-23 DIAGNOSIS — R31.29 MICROHEMATURIA: Primary | ICD-10-CM

## 2024-07-23 LAB
SL AMB  POCT GLUCOSE, UA: ABNORMAL
SL AMB LEUKOCYTE ESTERASE,UA: ABNORMAL
SL AMB POCT BILIRUBIN,UA: ABNORMAL
SL AMB POCT BLOOD,UA: ABNORMAL
SL AMB POCT CLARITY,UA: ABNORMAL
SL AMB POCT COLOR,UA: YELLOW
SL AMB POCT KETONES,UA: ABNORMAL
SL AMB POCT NITRITE,UA: ABNORMAL
SL AMB POCT PH,UA: 5.5
SL AMB POCT SPECIFIC GRAVITY,UA: 1.02
SL AMB POCT URINE PROTEIN: ABNORMAL
SL AMB POCT UROBILINOGEN: 0.2

## 2024-07-23 PROCEDURE — 81003 URINALYSIS AUTO W/O SCOPE: CPT | Performed by: UROLOGY

## 2024-07-23 PROCEDURE — 52000 CYSTOURETHROSCOPY: CPT | Performed by: UROLOGY

## 2024-07-23 RX ORDER — MIRABEGRON 50 MG/1
50 TABLET, EXTENDED RELEASE ORAL DAILY
Qty: 90 TABLET | Refills: 3 | Status: SHIPPED | OUTPATIENT
Start: 2024-07-23

## 2024-07-24 ENCOUNTER — OFFICE VISIT (OUTPATIENT)
Dept: PHYSICAL THERAPY | Facility: CLINIC | Age: 39
End: 2024-07-24
Payer: COMMERCIAL

## 2024-07-24 DIAGNOSIS — M75.01 ADHESIVE BURSITIS OF RIGHT SHOULDER: Primary | ICD-10-CM

## 2024-07-24 DIAGNOSIS — Z47.89 SURGICAL AFTERCARE, MUSCULOSKELETAL SYSTEM: ICD-10-CM

## 2024-07-24 PROCEDURE — 97140 MANUAL THERAPY 1/> REGIONS: CPT | Performed by: PHYSICAL THERAPIST

## 2024-07-24 PROCEDURE — 97110 THERAPEUTIC EXERCISES: CPT | Performed by: PHYSICAL THERAPIST

## 2024-07-24 PROCEDURE — 97112 NEUROMUSCULAR REEDUCATION: CPT | Performed by: PHYSICAL THERAPIST

## 2024-07-24 NOTE — PROGRESS NOTES
"Daily Note /Discharge    Today's date: 2024  Patient name: Clee Adrian  : 1985  MRN: [id number]  Referring provider: Tegan Sabillon PA-C  Dx:   Encounter Diagnosis     ICD-10-CM    1. Adhesive bursitis of right shoulder  M75.01       2. Surgical aftercare, musculoskeletal system  Z47.89                      Subjective: Patient notes that she is independent with her HEP and feels she is doing well overall. She is ready for d/c to her HEP at this time.       Objective: See treatment diary below      Assessment: Tolerated treatment well. Patient  has not achieved full ROM of the shoulder however states she is able to complete her ADLs well.  Patient has been educated with final HEP.       Plan:  D/C to HEP at this time.      Precautions: Asthma/ DOS 24     Daily Treatment Diary:      Initial Evaluation Date: 24  Compliance 6/21 6/25 6/27 7/2 7/3 7/9 7/11 7/18 7/24    Visit Number 11 12 13 14 15 16 17 18 19    Re-Eval              Foto Captured                     7/2 7/3     Manual             Shld PROM/ stretch 20 min 15m 15 min 15m 15 min 15 min 10 min 10 min 10 min    GH distraction/ caspular mob 5 min STM 5 min 5 min mob    5 min 5 min 5 min                 Ther-Ex             UBE 3'/3' 3'/3' 3'/3' 3'/3' 3'/3' 3'/3' 2'/2' 2'/2' 2'/2'    Pendulums- CW/CCW/Flex/ Ext x10            Pulleys 5 min 4 min 4 min 3 min ea 4 min 4 min 4 min - 4 min    Ball roll on table p-ball CW/ Flex/ scap 1 min ea p-ball CW/ Flex/ scap 1 min ea P-ball up wall x10 P-ball up wall x10 P-ball up wall x10 Pball roll up 15x Pball up wall 15x Pball up wall 15x Pball up wall 15x    Self assist hand held flex 5\" 2x10  5\" 2x10 5\"x20 5\" x20 5\"x20 5\" x20 5\"x20 5\" x20    Wand flexion 2# stand flex/ scap x10 3# stand flex/ scap x10 3# 2x10 3# 2x10 3# 2x10 3# 2x10 3# 2x10 3# 2x10 3# 2x10    Finger ladder 5\" x10 flex/ scap 5\"x10 flex/scap 5\" x10 - 5\"x10 5\"x10 5\" x10 5\"x10 5\" x10    Seated " "table slides- flex/ abd supine chest press 2# x15 supine chest press 3# x15 Supine chest press 3# x20 Supine chest press 3# x20 Supine chest press 3# x20 Chest press 3# 2x10 Chest press 3# 2x10 Chest press 3# 2x10 Chest press x3# 2x10    Wand ER/ towel IR  5\"x10 ea 5\" x10 5\"x10 5\" x10 5\"x10 5\" x10 5\"x10 5\" x10    Digi    Wall lesia x10 Wall lesia x10 Wall lesia x10 Wall lesia x10 Wall angels x10 Wall angels low x10 Wall angels x10    Wrist flex/ext   CW/CCW weight ball roll x10 CW/CCW weight ball roll x10 CW/CCW x10 Red cw/ccw 10x Red cw/ccw x10 Red cw/ccw     Sup / pronation x10            Bicep curl x10  3# x20 3# 20x 3# 20x 4# 20x 4# 20x 4# 20x 4$ 20x    TB PRE for shld GTB IR/ER/Ext/ Punch x15 GTB IR/ER/Ext/ Punch x20 GTB x20 GTB 20x GTB 20x GTB 20x GTB 20x GTB 20x GTB 20x    Neuro Re-Ed             No money   Robberies yellow x10 mod cues Robberies yellow x10 mod cues Robberies yellow x10 Robberies yellow x10 --> Robberies 15x yellow -    B Straight Arm Extension  GTB x15 GTB 20x GTB 20x GTB 20x GTB 20x GTB 20x GTB 20x GTB 20x GTB 20x                 Active scap retraction             Scap ret against wall 5\" x15 5\"x15 5\" x15 5\"x15 5\" x15 5\"x15 5\" x15 5\"x15 5\" x15                 Ther-Act                                                      Modalities             CP 10 min seated 10 min seated declined                                                 Access Code: [email or web address]  URL: [email or web address]  Date: 07/24/2024  Prepared by: Beryl Jackson    Exercises  - Circular Shoulder Pendulum with Table Support  - 3 x daily - 7 x weekly - 2 sets - 10 reps  - Flexion-Extension Shoulder Pendulum with Table Support  - 3 x daily - 7 x weekly - 2 sets - 10 reps  - Sidelying Shoulder External Rotation  - 1 x daily - 7 x weekly - 3 sets - 10 reps  - Sidelying Shoulder Abduction Palm Forward  - 1 x daily - 7 x weekly - 3 sets - 10 reps  - Wall [place]  - 1 x daily - 7 x weekly - 3 sets - 10 reps  - " Seated Scapular Retraction  - 1 x daily - 7 x weekly - 3 sets - 10 reps  - Supine Shoulder Flexion Extension AAROM with Dowel  - 1 x daily - 7 x weekly - 3 sets - 10 reps  - Shoulder Flexion Wall Slide with Towel  - 1 x daily - 7 x weekly - 3 sets - 10 reps

## 2024-07-25 ENCOUNTER — APPOINTMENT (OUTPATIENT)
Dept: PHYSICAL THERAPY | Facility: CLINIC | Age: 39
End: 2024-07-25
Payer: COMMERCIAL

## 2024-07-26 ENCOUNTER — HOSPITAL ENCOUNTER (OUTPATIENT)
Dept: GASTROENTEROLOGY | Facility: HOSPITAL | Age: 39
Setting detail: OUTPATIENT SURGERY
End: 2024-07-26
Attending: HOSPITALIST
Payer: COMMERCIAL

## 2024-07-26 ENCOUNTER — APPOINTMENT (OUTPATIENT)
Dept: PHYSICAL THERAPY | Facility: CLINIC | Age: 39
End: 2024-07-26
Payer: COMMERCIAL

## 2024-07-26 ENCOUNTER — ANESTHESIA EVENT (OUTPATIENT)
Dept: GASTROENTEROLOGY | Facility: HOSPITAL | Age: 39
End: 2024-07-26

## 2024-07-26 ENCOUNTER — ANESTHESIA (OUTPATIENT)
Dept: GASTROENTEROLOGY | Facility: HOSPITAL | Age: 39
End: 2024-07-26

## 2024-07-26 VITALS
TEMPERATURE: 97.6 F | OXYGEN SATURATION: 96 % | RESPIRATION RATE: 18 BRPM | HEART RATE: 88 BPM | DIASTOLIC BLOOD PRESSURE: 90 MMHG | SYSTOLIC BLOOD PRESSURE: 125 MMHG

## 2024-07-26 DIAGNOSIS — D50.9 IRON DEFICIENCY ANEMIA, UNSPECIFIED: ICD-10-CM

## 2024-07-26 DIAGNOSIS — K62.5 RECTAL BLEEDING: ICD-10-CM

## 2024-07-26 LAB
EXT PREGNANCY TEST URINE: NEGATIVE
EXT. CONTROL: NORMAL

## 2024-07-26 PROCEDURE — 81025 URINE PREGNANCY TEST: CPT | Performed by: HOSPITALIST

## 2024-07-26 PROCEDURE — 88342 IMHCHEM/IMCYTCHM 1ST ANTB: CPT | Performed by: PATHOLOGY

## 2024-07-26 PROCEDURE — 88305 TISSUE EXAM BY PATHOLOGIST: CPT | Performed by: PATHOLOGY

## 2024-07-26 RX ORDER — SODIUM CHLORIDE, SODIUM LACTATE, POTASSIUM CHLORIDE, CALCIUM CHLORIDE 600; 310; 30; 20 MG/100ML; MG/100ML; MG/100ML; MG/100ML
INJECTION, SOLUTION INTRAVENOUS CONTINUOUS PRN
Status: DISCONTINUED | OUTPATIENT
Start: 2024-07-26 | End: 2024-07-26

## 2024-07-26 RX ORDER — PROPOFOL 10 MG/ML
INJECTION, EMULSION INTRAVENOUS AS NEEDED
Status: DISCONTINUED | OUTPATIENT
Start: 2024-07-26 | End: 2024-07-26

## 2024-07-26 RX ADMIN — PROPOFOL 80 MG: 10 INJECTION, EMULSION INTRAVENOUS at 10:04

## 2024-07-26 RX ADMIN — PROPOFOL 100 MG: 10 INJECTION, EMULSION INTRAVENOUS at 09:54

## 2024-07-26 RX ADMIN — PROPOFOL 60 MG: 10 INJECTION, EMULSION INTRAVENOUS at 10:13

## 2024-07-26 RX ADMIN — PROPOFOL 150 MCG/KG/MIN: 10 INJECTION, EMULSION INTRAVENOUS at 10:05

## 2024-07-26 RX ADMIN — PROPOFOL 50 MG: 10 INJECTION, EMULSION INTRAVENOUS at 09:58

## 2024-07-26 RX ADMIN — PROPOFOL 100 MG: 10 INJECTION, EMULSION INTRAVENOUS at 09:49

## 2024-07-26 RX ADMIN — SODIUM CHLORIDE, SODIUM LACTATE, POTASSIUM CHLORIDE, AND CALCIUM CHLORIDE: .6; .31; .03; .02 INJECTION, SOLUTION INTRAVENOUS at 09:35

## 2024-07-26 RX ADMIN — PROPOFOL 100 MG: 10 INJECTION, EMULSION INTRAVENOUS at 10:01

## 2024-07-26 NOTE — ANESTHESIA POSTPROCEDURE EVALUATION
Post-Op Assessment Note    CV Status:  Stable    Pain management: adequate       Mental Status:  Alert and awake   Hydration Status:  Euvolemic   PONV Controlled:  Controlled   Airway Patency:  Patent     Post Op Vitals Reviewed: Yes    No anethesia notable event occurred.    Staff: CRNA               BP   140/71   Temp   98   Pulse  69   Resp   18   SpO2   98%

## 2024-07-26 NOTE — H&P
Procedure(s):  Colonoscopy with indication(s) of GEORGE  Esophagogastroduodenuoscopy with the indication(s) of GEORGE    Endoscopy Pre-Procedure Assessment:  Prior to the procedure, the patient is identified.  The patient's history, medications, and allergies have been reviewed.  The patient is competent.  The risks and benefits of the procedure procedure and the planned sedation have been discussed with the patient.  All questions have been answered and informed consent for the procedure has been obtained.    Vitals:    07/26/24 0918   BP: 118/75   Pulse: 81   Resp: 20   Temp: 97.5 °F (36.4 °C)   SpO2: 98%       Physical Exam:  Physical Exam  Eyes:      Conjunctiva/sclera: Conjunctivae normal.   Cardiovascular:      Rate and Rhythm: Normal rate.   Pulmonary:      Effort: Pulmonary effort is normal.   Abdominal:      Palpations: Abdomen is soft.   Neurological:      General: No focal deficit present.      Mental Status: She is alert.   Psychiatric:         Mood and Affect: Mood normal.                Consent:    We have discussed the procedure in detail. We reviewed risks, benefits and alternative as well as potentional complications including and not limited to medication side effect , infection, bleeding, perforation and the potential need for surgery, ICU admission, CPR, as well as the need for blood product transfusion. Patient verbalized understanding and agreement. All patient questions were answered.     After reviewed the risks and benefits, the patient is deemed in satisfactory condition to undergo the procedure.  The anesthesia plan is to use monitored anesthesia care (MAC).      07/26/24

## 2024-07-26 NOTE — ANESTHESIA PREPROCEDURE EVALUATION
Procedure:  EGD  COLONOSCOPY    Relevant Problems   No relevant active problems        Physical Exam    Airway    Mallampati score: II  TM Distance: >3 FB       Dental    lower dentures and upper dentures    Cardiovascular  Rhythm: regular, Rate: normal    Pulmonary   Breath sounds clear to auscultation    Other Findings  post-pubertal.      Anesthesia Plan  ASA Score- 2     Anesthesia Type- IV sedation with anesthesia with ASA Monitors.         Additional Monitors:     Airway Plan: NTT.           Plan Factors-Exercise tolerance (METS): >4 METS.    Chart reviewed. EKG reviewed. Imaging results reviewed. Existing labs reviewed. Patient summary reviewed.                  Induction- intravenous.    Postoperative Plan- Plan for postoperative opioid use.     Perioperative Resuscitation Plan - Level 1 - Full Code.       Informed Consent- Anesthetic plan and risks discussed with patient.  I personally reviewed this patient with the CRNA. Discussed and agreed on the Anesthesia Plan with the CRNA..

## 2024-07-28 ENCOUNTER — NURSE TRIAGE (OUTPATIENT)
Dept: OTHER | Facility: OTHER | Age: 39
End: 2024-07-28

## 2024-07-28 NOTE — TELEPHONE ENCOUNTER
"Regarding: post op egd colonoscopy/throat pain  ----- Message from Alondra HUERTA sent at 7/28/2024  5:21 PM EDT -----  Pt stated, \"I just had an colonoscopy and egd. Today my throat hurts very bad. It feels like there is phlegm I need to cough up by can't.\"    "

## 2024-07-28 NOTE — TELEPHONE ENCOUNTER
"Reason for Disposition  • Sore throat or hoarseness after endoscopy, questions about    Answer Assessment - Initial Assessment Questions  1. DATE/TIME: \"When did you have your endoscopy?\"       Friday    2. MAIN CONCERN: \"What is your main concern right now?\" \"What questions do you have?\"      Throat is very sore, patient has been coughing and it is very tender    3. ADOMINAL PAIN: \"Are you having any abdominal (belly or stomach) pain?\" If Yes, ask: \"How bad is it?\" (e.g., Scale 1-10; mild, moderate, severe).     - MILD (1-3): doesn't interfere with normal activities, abdomen soft and not tender to touch      - MODERATE (4-7): interferes with normal activities or awakens from sleep, tender to touch      - SEVERE (8-10): excruciating pain, doubled over, unable to do any normal activities        Denies    4. OTHER SYMPTOMS: \"What other symptoms are you having?\" (e.g., breathing or swallowing problems, chest pain, throat pain, hoarseness, vomiting, stomach pain, bloating, fever, dizziness)      Cough and sore throat    5. ONSET: \"When did your symptoms start?\"      Yesterday morning    6. PATTERN: \"Is the symptom(s) constant or does it come and go?\" \"Is your symptom(s) getting worse, better, or staying the same?\"      Constant    Able to drink fluids and swallow foods; 7/10 pain    Protocols used: Endoscopy (Upper GI) Symptoms and Questions-ADULT-AH    "

## 2024-07-29 NOTE — TELEPHONE ENCOUNTER
Called and spoke with patient.She states she continues to have a sore throat since having her EGD.I informed her that a sore throat may continue for a few days after her EGD.She states she is eating and drinking.

## 2024-07-30 ENCOUNTER — APPOINTMENT (OUTPATIENT)
Dept: PHYSICAL THERAPY | Facility: CLINIC | Age: 39
End: 2024-07-30
Payer: COMMERCIAL

## 2024-07-30 PROCEDURE — 88305 TISSUE EXAM BY PATHOLOGIST: CPT | Performed by: PATHOLOGY

## 2024-07-30 PROCEDURE — 88342 IMHCHEM/IMCYTCHM 1ST ANTB: CPT | Performed by: PATHOLOGY

## 2024-09-03 ENCOUNTER — HOSPITAL ENCOUNTER (EMERGENCY)
Facility: HOSPITAL | Age: 39
Discharge: HOME | End: 2024-09-03
Attending: EMERGENCY MEDICINE | Admitting: EMERGENCY MEDICINE
Payer: COMMERCIAL

## 2024-09-03 ENCOUNTER — APPOINTMENT (EMERGENCY)
Dept: RADIOLOGY | Facility: HOSPITAL | Age: 39
End: 2024-09-03
Payer: COMMERCIAL

## 2024-09-03 VITALS
SYSTOLIC BLOOD PRESSURE: 120 MMHG | RESPIRATION RATE: 16 BRPM | OXYGEN SATURATION: 99 % | DIASTOLIC BLOOD PRESSURE: 93 MMHG | TEMPERATURE: 98.1 F | HEART RATE: 83 BPM

## 2024-09-03 DIAGNOSIS — M25.561 RIGHT KNEE PAIN: Primary | ICD-10-CM

## 2024-09-03 PROCEDURE — 99283 EMERGENCY DEPT VISIT LOW MDM: CPT

## 2024-09-03 PROCEDURE — 99284 EMERGENCY DEPT VISIT MOD MDM: CPT | Performed by: PHYSICIAN ASSISTANT

## 2024-09-03 PROCEDURE — 73564 X-RAY EXAM KNEE 4 OR MORE: CPT

## 2024-09-03 RX ORDER — OXYCODONE AND ACETAMINOPHEN 5; 325 MG/1; MG/1
1 TABLET ORAL ONCE
Status: COMPLETED | OUTPATIENT
Start: 2024-09-03 | End: 2024-09-03

## 2024-09-03 RX ORDER — NAPROXEN 500 MG/1
500 TABLET ORAL 2 TIMES DAILY WITH MEALS
Qty: 14 TABLET | Refills: 0 | Status: SHIPPED | OUTPATIENT
Start: 2024-09-03 | End: 2024-09-10

## 2024-09-03 RX ADMIN — OXYCODONE HYDROCHLORIDE AND ACETAMINOPHEN 1 TABLET: 5; 325 TABLET ORAL at 16:15

## 2024-09-03 NOTE — DISCHARGE INSTRUCTIONS
Your condition could be a soft tissue injury or related to a meniscus injury  Please follow up with orthopedics for further evaluation if you continue to have symptoms

## 2024-09-03 NOTE — ED PROVIDER NOTES
History  Chief Complaint   Patient presents with    Leg Pain     Pt was sitting down watching tv, not moving, when she felt a pop in her right leg. Pt states the pain is mostly in her knee and c/o pain bearing weight.      The patient is a 39-year-old female presents emerged from today by ambulance for the concern of right knee pain.  The patient states that she was going to stand up from a sitting position and felt a pop with severe pain in her right knee.  Patient states that she is having severe pain in her right knee.  She denies any falls.           Prior to Admission Medications   Prescriptions Last Dose Informant Patient Reported? Taking?   ARIPiprazole (ABILIFY) 5 mg tablet   Yes No   Sig: Take 5 mg by mouth daily   Acetaminophen 500 MG   Yes No   Sig: Take 500 mg by mouth every 6 (six) hours as needed   Mirabegron ER 50 MG TB24   No No   Sig: Take 1 tablet (50 mg total) by mouth in the morning   ProAir  (90 Base) MCG/ACT inhaler   Yes No   Sig: Inhale 2 puffs every 4 (four) hours as needed   busPIRone (BUSPAR) 5 mg tablet   Yes No   Sig: Take 1 tablet by mouth twice daily for anxiety.   cetirizine (ZyrTEC) 10 mg tablet   Yes No   Sig: Take 1 tablet by mouth daily as needed   clotrimazole-betamethasone (LOTRISONE) 1-0.05 % cream   Yes No   Sig: Apply topically 2 (two) times a day   escitalopram (LEXAPRO) 10 mg tablet   Yes No   Sig: Take 1 tablet by mouth daily   fluticasone (FLONASE) 50 mcg/act nasal spray   Yes No   Sig: USE TWO (2) SPRAYS IN EACH NOSTRIL ONCE DAILY   hydrOXYzine HCL (ATARAX) 25 mg tablet   Yes No   Sig: Take 1 tablet by mouth 2 (two) times a day as needed   ibuprofen (MOTRIN) 400 mg tablet   Yes No   Sig: Take 400 mg by mouth every 4 (four) hours as needed   melatonin 3 mg   Yes No   Sig: Take 5 mg by mouth   montelukast (SINGULAIR) 10 mg tablet   Yes No   Sig: Take 1 tablet by mouth daily   naproxen (Naprosyn) 500 mg tablet   No No   Sig: Take 1 tablet (500 mg total) by mouth 2  (two) times a day with meals   norethindrone-ethinyl estradiol (FEMHRT 1/5) 1-5 MG-MCG TABS   Yes No   Sig: Take 1 tablet by mouth daily   omeprazole (PriLOSEC) 40 MG capsule   Yes No   Sig: Take 40 mg by mouth daily   rosuvastatin (CRESTOR) 20 MG tablet   Yes No   Sig: Take 1 tablet by mouth every morning   semaglutide, 0.25 or 0.5 mg/dose, (Ozempic, 0.25 or 0.5 MG/DOSE,) 2 mg/3 mL injection pen   Yes No   Sig: Inject 0.5 mg under the skin   topiramate (TOPAMAX) 50 MG tablet   Yes No   Sig: Take 1 tablet by mouth 2 (two) times a day      Facility-Administered Medications: None       Past Medical History:   Diagnosis Date    Anxiety and depression     Asthma     Bipolar disorder, unspecified (HCC)     Cerebral palsy (HCC)     Dyslipidemia     Elevated alkaline phosphatase level     GERD (gastroesophageal reflux disease)     Major depressive disorder     Migraines     Obesity without serious comorbidity     Other allergic rhinitis     Paranoia (HCC)     Prediabetes     PTSD (post-traumatic stress disorder)     Social phobia     Thrombocytosis        Past Surgical History:   Procedure Laterality Date    CHOLECYSTECTOMY      DENTAL SURGERY         History reviewed. No pertinent family history.  I have reviewed and agree with the history as documented.    E-Cigarette/Vaping    E-Cigarette Use Never User      E-Cigarette/Vaping Substances     Social History     Tobacco Use    Smoking status: Never    Smokeless tobacco: Never   Vaping Use    Vaping status: Never Used   Substance Use Topics    Alcohol use: Not Currently     Comment: ocassional    Drug use: Yes       Review of Systems   All other systems reviewed and are negative.      Physical Exam  Physical Exam  Vitals and nursing note reviewed.   Constitutional:       General: She is in acute distress.      Appearance: She is well-developed.   HENT:      Head: Normocephalic and atraumatic.   Eyes:      Pupils: Pupils are equal, round, and reactive to light.    Cardiovascular:      Rate and Rhythm: Normal rate.   Pulmonary:      Effort: Pulmonary effort is normal.      Breath sounds: Normal breath sounds.   Musculoskeletal:      Cervical back: Normal range of motion.      Right hip: Normal.      Right knee: Bony tenderness present. Tenderness present over the medial joint line and lateral joint line.      Right ankle: Normal.   Skin:     General: Skin is warm and dry.      Capillary Refill: Capillary refill takes less than 2 seconds.   Neurological:      General: No focal deficit present.      Mental Status: She is alert.      Coordination: Coordination normal.   Psychiatric:         Behavior: Behavior normal.         Vital Signs  ED Triage Vitals [09/03/24 1558]   Temperature Pulse Respirations Blood Pressure SpO2   98.1 °F (36.7 °C) 83 16 120/93 99 %      Temp Source Heart Rate Source Patient Position - Orthostatic VS BP Location FiO2 (%)   Temporal Monitor Sitting Right arm --      Pain Score       8           Vitals:    09/03/24 1558   BP: 120/93   Pulse: 83   Patient Position - Orthostatic VS: Sitting         Visual Acuity      ED Medications  Medications   oxyCODONE-acetaminophen (PERCOCET) 5-325 mg per tablet 1 tablet (1 tablet Oral Given 9/3/24 1615)       Diagnostic Studies  Results Reviewed       None                   XR knee 4+ vw right injury   ED Interpretation by Tee Michael PA-C (09/03 1654)   No acute fractures                   Procedures  Procedures         ED Course                                 SBIRT 22yo+      Flowsheet Row Most Recent Value   Initial Alcohol Screen: US AUDIT-C     1. How often do you have a drink containing alcohol? 0 Filed at: 09/03/2024 1558   2. How many drinks containing alcohol do you have on a typical day you are drinking?  0 Filed at: 09/03/2024 1558   3a. Male UNDER 65: How often do you have five or more drinks on one occasion? 0 Filed at: 09/03/2024 1558   3b. FEMALE Any Age, or MALE 65+: How often do you have 4  or more drinks on one occassion? 0 Filed at: 09/03/2024 1558   Audit-C Score 0 Filed at: 09/03/2024 1558   CATY: How many times in the past year have you...    Used an illegal drug or used a prescription medication for non-medical reasons? Never Filed at: 09/03/2024 1558                      Medical Decision Making  The patient is a 39-year-old female presents emerged from today by ambulance for the concern of right knee pain.  The patient states that she was going to stand up from a sitting position and felt a pop with severe pain in her right knee.  Patient states that she is having severe pain in her right knee.  She denies any falls  Patient was having pain in the medial and lateral joint line.  Pain with active range of motion.  Patient had no bony abnormality seen on x-ray was given a knee immobilizer and crutches for symptomatic treatment.  Was given treatment with naproxen and Ortho follow-up if this were to persist.  The patient's condition similar to soft tissue injury versus meniscal injury versus sprain.    Amount and/or Complexity of Data Reviewed  Radiology: ordered and independent interpretation performed. Decision-making details documented in ED Course.    Risk  Prescription drug management.                 Disposition  Final diagnoses:   Right knee pain     Time reflects when diagnosis was documented in both MDM as applicable and the Disposition within this note       Time User Action Codes Description Comment    9/3/2024  5:01 PM Tee Michael Add [M25.561] Right knee pain           ED Disposition       ED Disposition   Discharge    Condition   Stable    Date/Time   Tue Sep 3, 2024 1701    Comment   Cele Adrian discharge to home/self care.                   Follow-up Information    None         Discharge Medication List as of 9/3/2024  5:03 PM        START taking these medications    Details   !! naproxen (NAPROSYN) 500 mg tablet Take 1 tablet (500 mg total) by mouth 2 (two) times a day with  meals for 7 days, Starting Tue 9/3/2024, Until Tue 9/10/2024, Normal       !! - Potential duplicate medications found. Please discuss with provider.        CONTINUE these medications which have NOT CHANGED    Details   Acetaminophen 500 MG Take 500 mg by mouth every 6 (six) hours as needed, Starting Thu 2/22/2024, Historical Med      ARIPiprazole (ABILIFY) 5 mg tablet Take 5 mg by mouth daily, Starting Wed 2/28/2024, Historical Med      busPIRone (BUSPAR) 5 mg tablet Take 1 tablet by mouth twice daily for anxiety., Historical Med      cetirizine (ZyrTEC) 10 mg tablet Take 1 tablet by mouth daily as needed, Starting Mon 2/19/2024, Until Tue 2/18/2025 at 2359, Historical Med      clotrimazole-betamethasone (LOTRISONE) 1-0.05 % cream Apply topically 2 (two) times a day, Starting Thu 5/18/2023, Until Tue 7/23/2024, Historical Med      escitalopram (LEXAPRO) 10 mg tablet Take 1 tablet by mouth daily, Starting Wed 2/28/2024, Historical Med      fluticasone (FLONASE) 50 mcg/act nasal spray USE TWO (2) SPRAYS IN EACH NOSTRIL ONCE DAILY, Historical Med      hydrOXYzine HCL (ATARAX) 25 mg tablet Take 1 tablet by mouth 2 (two) times a day as needed, Starting Wed 2/28/2024, Historical Med      ibuprofen (MOTRIN) 400 mg tablet Take 400 mg by mouth every 4 (four) hours as needed, Starting Thu 2/22/2024, Until Tue 7/23/2024 at 2359, Historical Med      melatonin 3 mg Take 5 mg by mouth, Historical Med      Mirabegron ER 50 MG TB24 Take 1 tablet (50 mg total) by mouth in the morning, Starting Tue 7/23/2024, Normal      montelukast (SINGULAIR) 10 mg tablet Take 1 tablet by mouth daily, Starting Fri 6/9/2023, Until Fri 7/26/2024, Historical Med      !! naproxen (Naprosyn) 500 mg tablet Take 1 tablet (500 mg total) by mouth 2 (two) times a day with meals, Starting Sat 7/22/2023, Print      norethindrone-ethinyl estradiol (FEMHRT 1/5) 1-5 MG-MCG TABS Take 1 tablet by mouth daily, Starting Wed 4/17/2024, Historical Med      omeprazole  (PriLOSEC) 40 MG capsule Take 40 mg by mouth daily, Historical Med      ProAir  (90 Base) MCG/ACT inhaler Inhale 2 puffs every 4 (four) hours as needed, Starting Mon 6/17/2024, Historical Med      rosuvastatin (CRESTOR) 20 MG tablet Take 1 tablet by mouth every morning, Starting Fri 6/9/2023, Until Fri 7/26/2024, Historical Med      semaglutide, 0.25 or 0.5 mg/dose, (Ozempic, 0.25 or 0.5 MG/DOSE,) 2 mg/3 mL injection pen Inject 0.5 mg under the skin, Starting Tue 2/6/2024, Historical Med      topiramate (TOPAMAX) 50 MG tablet Take 1 tablet by mouth 2 (two) times a day, Starting Thu 2/8/2024, Historical Med       !! - Potential duplicate medications found. Please discuss with provider.              PDMP Review       None            ED Provider  Electronically Signed by             Tee Michael PA-C  09/03/24 1929

## 2024-09-04 NOTE — ED ATTENDING ATTESTATION
9/3/2024  I, Brooke Pope DO, discussed the patient with the resident/non-physician practitioner and agree with the resident's/non-physician practitioner's findings, Plan of Care, and MDM as documented in the resident's/non-physician practitioner's note, except where noted. All available labs and Radiology studies were reviewed.  I was present for key portions of any procedure(s) performed by the resident/non-physician practitioner and I was immediately available to provide assistance.       At this point I agree with the current assessment done in the Emergency Department.

## 2024-09-06 ENCOUNTER — OFFICE VISIT (OUTPATIENT)
Dept: OBGYN CLINIC | Facility: CLINIC | Age: 39
End: 2024-09-06
Payer: COMMERCIAL

## 2024-09-06 VITALS
HEART RATE: 86 BPM | DIASTOLIC BLOOD PRESSURE: 82 MMHG | TEMPERATURE: 97.4 F | SYSTOLIC BLOOD PRESSURE: 122 MMHG | WEIGHT: 227 LBS | OXYGEN SATURATION: 97 % | BODY MASS INDEX: 42.86 KG/M2 | HEIGHT: 61 IN

## 2024-09-06 DIAGNOSIS — M17.11 PRIMARY OSTEOARTHRITIS OF RIGHT KNEE: ICD-10-CM

## 2024-09-06 DIAGNOSIS — M25.561 ACUTE PAIN OF RIGHT KNEE: Primary | ICD-10-CM

## 2024-09-06 PROCEDURE — 99203 OFFICE O/P NEW LOW 30 MIN: CPT | Performed by: STUDENT IN AN ORGANIZED HEALTH CARE EDUCATION/TRAINING PROGRAM

## 2024-09-06 NOTE — PROGRESS NOTES
1. Acute pain of right knee  Ambulatory referral to Orthopedic Surgery      2. Primary osteoarthritis of right knee          No orders of the defined types were placed in this encounter.       Imaging Studies (I personally reviewed images in PACS and report):    X-ray right knee injury views 9/3/2024: No acute osseous abnormalities.  No joint effusion.  Mild tricompartmental osteoarthritic changes evidenced by marginal osteophytes.  Thin vertical linear ossific body superior to the patella    IMPRESSION:  Acute right knee pain/clicking  Reportedly gives injury story where she felt a popping sensation and pain of her knee while sitting and watching TV  Radiographs note mild OA changes  Currently symptoms improving with naproxen only some patellofemoral mild tenderness on exam. Otherwise has full knee ROM/strength  Less suspicious for internal derangement given ADIEL and possibly pain from OA    Other factors:  BM 43    PLAN:    Clinical exam and radiographic imaging reviewed with patient today, with impression as per above. I have discussed with the patient the pathophysiology of this diagnosis and reviewed how the examination correlates with this diagnosis.    Prior imaging reviewed as noted above.  I counseled patient that I did not suspect an internal derangement at this time.  I discussed that she does have some osteoarthritic changes of her knee which could suggest why she has pain and crepitus of her knee in general.  Recommended she complete the 7-day naproxen course from the ER.  She can discontinue use of the knee immobilizer and crutches and weight-bear as tolerated on her right lower extremity.  I counseled her she could consider use of a compression knee sleeve with activities during the day.  We will keep a close follow-up in 2 weeks to reevaluate her symptoms.  Counseled we could also consider an intra-articular cortisone injection of her knee if these interventions as above do not provide sufficient  "relief.  Patient agreeable with plan.    Return in about 2 weeks (around 9/20/2024), or if symptoms worsen or fail to improve.      Portions of the record may have been created with voice recognition software. Occasional wrong word or \"sound a like\" substitutions may have occurred due to the inherent limitations of voice recognition software. Read the chart carefully and recognize, using context, where substitutions have occurred.     CHIEF COMPLAINT:  Chief Complaint   Patient presents with    Right Knee - Pain         HPI:  Cele Adrian is a 39 y.o. female  who presents for       Visit 9/6/2024 :  Initial evaluation of right knee pain:  Of note patient has a history of cerebral palsy, bipolar disorder, BMI 42.89  Ongoing since yesterday.  Patient reportedly was sitting and watching TV when she just had a sudden popping sensation of her right knee/leg.  After this event she was unable to weight-bear without exacerbating the pain further.  Pain was generalized.  She denies any swelling or discoloration.  She reports sense of the stiffness.  Prior to her injury, patient states she will have intermittent clicking of her knee and pain overall but never to this severity.  Seen by the ER on the same day and had imaging done as noted above.  Patient was given a knee immobilizer and crutches.  She was prescribed naproxen.  Patient reports today that her symptoms have improved considerably since yesterday since taking naproxen.  She states difficulty with using crutches in the immobilizer though for mobility.  She  states her pain at this point is anteriorly and around her kneecap.  Denies any N/C of her right lower extremity.  Denies prior surgeries of her right knee in the past.          Medical, Surgical, Family, and Social History    Past Medical History:   Diagnosis Date    Anxiety and depression     Asthma     Bipolar disorder, unspecified (HCC)     Cerebral palsy (HCC)     Dyslipidemia     Elevated alkaline " "phosphatase level     GERD (gastroesophageal reflux disease)     Major depressive disorder     Migraines     Obesity without serious comorbidity     Other allergic rhinitis     Paranoia (HCC)     Prediabetes     PTSD (post-traumatic stress disorder)     Social phobia     Thrombocytosis      Past Surgical History:   Procedure Laterality Date    CHOLECYSTECTOMY      DENTAL SURGERY       Social History   Social History     Substance and Sexual Activity   Alcohol Use Not Currently    Comment: ocassional     Social History     Substance and Sexual Activity   Drug Use Never     Social History     Tobacco Use   Smoking Status Never   Smokeless Tobacco Never     History reviewed. No pertinent family history.  Allergies   Allergen Reactions    Lidocaine Other (See Comments)     Couldnt Keep Anything Down, Vomiting/Dehydrated  Couldnt Keep Anything Down, Vomiting/Dehydrated  Couldnt Keep Anything Down, Vomiting/Dehydrated      Procaine Other (See Comments) and GI Intolerance          Physical Exam  /82   Pulse 86   Temp (!) 97.4 °F (36.3 °C) (Temporal)   Ht 5' 1\" (1.549 m)   Wt 103 kg (227 lb)   SpO2 97%   BMI 42.89 kg/m²     Constitutional:  see vital signs  Gen: Obese, normocephalic/atraumatic, well-groomed  Eyes: No inflammation or discharge of conjunctiva or lids; sclera clear   Pulmonary/Chest: Effort normal. No respiratory distress.     Ortho Exam  Right knee Exam:  Erythema: no  Swelling: no  Increased Warmth: no  Tenderness: Reports mildly tender when palpating over the superolateral patellofemoral joint line  ROM: 0-130  Knee flexion strength: 5/5  Knee extension strength: 5/5  Patellar Apprehension: negative  Patellar Grind: negative  Lachman's: negative  Anterior Drawer: negative  Varus laxity: negative  Valgus laxity: negative  [facility]: negative     Gait: Knee immobilizer removed.  Patient is able to demonstrate weightbearing on her right lower extremity she states minimal knee aching sensation while " walking.    Procedures

## 2024-09-20 ENCOUNTER — OFFICE VISIT (OUTPATIENT)
Dept: OBGYN CLINIC | Facility: CLINIC | Age: 39
End: 2024-09-20
Payer: COMMERCIAL

## 2024-09-20 VITALS
OXYGEN SATURATION: 98 % | HEIGHT: 61 IN | BODY MASS INDEX: 42.86 KG/M2 | DIASTOLIC BLOOD PRESSURE: 80 MMHG | SYSTOLIC BLOOD PRESSURE: 118 MMHG | TEMPERATURE: 97.9 F | WEIGHT: 227 LBS | HEART RATE: 75 BPM

## 2024-09-20 DIAGNOSIS — M25.561 ACUTE PAIN OF RIGHT KNEE: ICD-10-CM

## 2024-09-20 DIAGNOSIS — M17.11 PRIMARY OSTEOARTHRITIS OF RIGHT KNEE: ICD-10-CM

## 2024-09-20 DIAGNOSIS — M25.561 PATELLOFEMORAL JOINT PAIN, RIGHT: Primary | ICD-10-CM

## 2024-09-20 DIAGNOSIS — S89.91XD RIGHT KNEE INJURY, SUBSEQUENT ENCOUNTER: ICD-10-CM

## 2024-09-20 PROCEDURE — 99213 OFFICE O/P EST LOW 20 MIN: CPT | Performed by: STUDENT IN AN ORGANIZED HEALTH CARE EDUCATION/TRAINING PROGRAM

## 2024-09-20 NOTE — PROGRESS NOTES
1. Patellofemoral joint pain, right        2. Acute pain of right knee        3. Primary osteoarthritis of right knee        4. Right knee injury, subsequent encounter            No orders of the defined types were placed in this encounter.       Imaging Studies (I personally reviewed images in PACS and report):    X-ray right knee injury views 9/3/2024: No acute osseous abnormalities.  No joint effusion.  Mild tricompartmental osteoarthritic changes evidenced by marginal osteophytes.  Thin vertical linear ossific body superior to the patella    IMPRESSION:  Acute right knee pain/clicking  Reportedly gives injury story where she felt a popping sensation and pain of her knee while sitting and watching TV  Radiographs note mild OA changes  Symptoms improving since last visit with naproxen.  Patient able demonstrate weightbearing on right lower extremity.  She states at this point she only has intermittent pains around the peripatellar aspect of her knee with walking, crepitus.  Clinical history and exam consistent with patellofemoral knee pain syndrome/patellofemoral arthritic pain.  No suspicion for internal derangement    Other factors:  BM 43  Bipolar d/o, cerebral palsy    PLAN:    Clinical exam and radiographic imaging reviewed with patient today, with impression as per above. I have discussed with the patient the pathophysiology of this diagnosis and reviewed how the examination correlates with this diagnosis.    Reassured patient of her progressive improvement since last visit.  Counseled this point management of her knee pain is through use of acetaminophen, oral/topical NSAIDs, heat/ice therapy.  She could consider use of Ace wrap for her knee.  I counseled her to correct the crepitus/pain from her patella that I would encourage formal PT.  Patient deferred this option for now but will call back if she decides to attend in the future.    No follow-ups on file.      Portions of the record may have been created  "with voice recognition software. Occasional wrong word or \"sound a like\" substitutions may have occurred due to the inherent limitations of voice recognition software. Read the chart carefully and recognize, using context, where substitutions have occurred.     CHIEF COMPLAINT:  Chief Complaint   Patient presents with    Follow-up         HPI:  Cele Adrian is a 39 y.o. female  who presents with family member for     Visit 9/20/2024:  Follow up right knee pain/injury:  Improved since last visit.  She states completing the naproxen and felt that it significantly improved her pain.  She states overall she has been doing well but will intermittently feel pain with prolonged walking along the superior and peripatellar aspect of her knee.  She reports intermittent clicking/grinding sensation of her kneecap.  She denies any giving out sensations.  Denies any locking sensation.  Feels that she has full range of motion of her knee.  She states attempting to get a compression sleeve for her knee but felt they were too tight to wear.    Visit 9/6/2024 :  Initial evaluation of right knee pain:  Of note patient has a history of cerebral palsy, bipolar disorder, BMI 42.89  Ongoing since yesterday.  Patient reportedly was sitting and watching TV when she just had a sudden popping sensation of her right knee/leg.  After this event she was unable to weight-bear without exacerbating the pain further.  Pain was generalized.  She denies any swelling or discoloration.  She reports sense of the stiffness.  Prior to her injury, patient states she will have intermittent clicking of her knee and pain overall but never to this severity.  Seen by the ER on the same day and had imaging done as noted above.  Patient was given a knee immobilizer and crutches.  She was prescribed naproxen.  Patient reports today that her symptoms have improved considerably since yesterday since taking naproxen.  She states difficulty with using crutches in " "the immobilizer though for mobility.  She  states her pain at this point is anteriorly and around her kneecap.  Denies any N/C of her right lower extremity.  Denies prior surgeries of her right knee in the past.          Medical, Surgical, Family, and Social History    Past Medical History:   Diagnosis Date    Anxiety and depression     Asthma     Bipolar disorder, unspecified (HCC)     Cerebral palsy (HCC)     Dyslipidemia     Elevated alkaline phosphatase level     GERD (gastroesophageal reflux disease)     Major depressive disorder     Migraines     Obesity without serious comorbidity     Other allergic rhinitis     Paranoia (HCC)     Prediabetes     PTSD (post-traumatic stress disorder)     Social phobia     Thrombocytosis      Past Surgical History:   Procedure Laterality Date    CHOLECYSTECTOMY      DENTAL SURGERY       Social History   Social History     Substance and Sexual Activity   Alcohol Use Not Currently    Comment: ocassional     Social History     Substance and Sexual Activity   Drug Use Never     Social History     Tobacco Use   Smoking Status Never   Smokeless Tobacco Never     History reviewed. No pertinent family history.  Allergies   Allergen Reactions    Lidocaine Other (See Comments)     Couldnt Keep Anything Down, Vomiting/Dehydrated  Couldnt Keep Anything Down, Vomiting/Dehydrated  Couldnt Keep Anything Down, Vomiting/Dehydrated      Procaine Other (See Comments) and GI Intolerance          Physical Exam  /80   Pulse 75   Temp 97.9 °F (36.6 °C) (Temporal)   Ht 5' 1\" (1.549 m)   Wt 103 kg (227 lb)   SpO2 98%   BMI 42.89 kg/m²     Constitutional:  see vital signs  Gen: Obese, normocephalic/atraumatic, well-groomed  Eyes: No inflammation or discharge of conjunctiva or lids; sclera clear   Pulmonary/Chest: Effort normal. No respiratory distress.     Ortho Exam  Right knee Exam:  Erythema: no  Swelling: no  Increased Warmth: no  Tenderness:+ Tender to palpation at the insertion of her " quadriceps tendon and over the medial patellofemoral joint line  ROM: 0-130  Knee flexion strength: 5/5  Knee extension strength: 5/5  Patellar Apprehension: negative  Patellar Grind: +  Lachman's: negative  Anterior Drawer: negative  Varus laxity: negative  Valgus laxity: negative  [facility]: negative     Gait: Without antalgia    Procedures

## 2024-12-16 ENCOUNTER — TELEMEDICINE (OUTPATIENT)
Dept: OTHER | Facility: HOSPITAL | Age: 39
End: 2024-12-16
Payer: COMMERCIAL

## 2024-12-16 DIAGNOSIS — R05.1 ACUTE COUGH: Primary | ICD-10-CM

## 2024-12-16 PROCEDURE — 99213 OFFICE O/P EST LOW 20 MIN: CPT | Performed by: NURSE PRACTITIONER

## 2024-12-16 NOTE — PATIENT INSTRUCTIONS
As we discussed your illness is viral.  No antibiotics are indicated at this time.  Rest and drink extra fluids. Cough medication as prescribed.  Tylenol or Motrin as needed for pain or fever.  Salt water gargles and throat lozenges for sore throat.  Follow up with PCP if no improvement.  Go to ER with worsening symptoms.      Cold Symptoms   WHAT YOU NEED TO KNOW:   A cold is an infection caused by a virus. The infection causes your upper respiratory system to become inflamed. Common symptoms of a cold include sneezing, dry throat, a stuffy nose, headache, watery eyes, and a cough. Your cough may be dry, or you may cough up mucus. You may also have muscle aches, joint pain, and tiredness. Rarely, you may have a fever. Most colds go away without treatment.  DISCHARGE INSTRUCTIONS:   Return to the emergency department if:   You have increased tiredness and weakness.    You are unable to eat.     Your heart is beating much faster than usual for you.     You see white spots in the back of your throat and your neck is swollen and sore to the touch.     You see pinpoint or larger reddish-purple dots on your skin.  Contact your healthcare provider if:   You have a fever higher than 102°F (38.9°C).    You have new or worsening shortness of breath.     You have thick nasal drainage for more than 2 days.     Your symptoms do not improve or get worse within 5 days.     You have questions or concerns about your condition or care.  Medicines:  The following medicines may be suggested by your healthcare provider to decrease your cold symptoms. These medicines are available without a doctor's order. Ask which medicines to take and when to take them. Follow directions.  NSAIDs or acetaminophen  help to bring down a fever or decrease pain.    Decongestants  help decrease nasal stuffiness.     Antihistamines  help decrease sneezing and a runny nose.     Cough suppressants  help decrease how much you cough.    Expectorants  help  loosen mucus so you can cough it up.    Take your medicine as directed.  Contact your healthcare provider if you think your medicine is not helping or if you have side effects. Tell him of her if you are allergic to any medicine. Keep a list of the medicines, vitamins, and herbs you take. Include the amounts, and when and why you take them. Bring the list or the pill bottles to follow-up visits. Carry your medicine list with you in case of an emergency.  Symptom relief:  The following may help relieve cold symptoms, such as a dry throat and congestion:  Gargle with mouthwash or warm salt water as directed.     Suck on throat lozenges or hard candy.     Use a cold or warm vaporizer or humidifier to ease your breathing.     Rest for at least 2 days and then as needed to decrease tiredness and weakness.     Use petroleum based jelly around your nostrils to decrease irritation from blowing your nose.  Drink liquids:  Liquids will help thin and loosen thick mucus so you can cough it up. Liquids will also keep you hydrated. Ask your healthcare provider which liquids are best for you and how much to drink each day.  Prevent the spread of germs:  You can spread your cold germs to others for at least 3 days after your symptoms start. Wash your hands often. Do not share items, such as eating utensils. Cover your nose and mouth when you cough or sneeze using the crook of your elbow instead of your hands. Throw used tissues in the garbage.  Do not smoke:  Smoking may worsen your symptoms and increase the length of time you feel sick. Talk with your healthcare provider if you need help to stop smoking.  Follow up with your healthcare provider as directed:  Write down your questions so you remember to ask them during your visits.   © 2017 [employer] Information is for End User's use only and may not be sold, redistributed or otherwise used for commercial purposes. All illustrations and images included in  CareNotes® are the copyrighted property of [employer], [employer]. or [employer].  The above information is an  only. It is not intended as medical advice for individual conditions or treatments. Talk to your doctor, nurse or pharmacist before following any medical regimen to see if it is safe and effective for you.

## 2024-12-16 NOTE — PROGRESS NOTES
Virtual Regular Visit  Name: Cele Adrian      : 1985      MRN: [id number]  Encounter Provider: Your Video Visit Provider  Encounter Date: 2024   Encounter department: VIRTUAL CARE       Verification of patient location:  Patient is located at Home in the following state in which I hold an active license [place] :  Assessment & Plan  Acute cough    Orders:    dextromethorphan 15 MG/5ML syrup; Take 10 mL (30 mg total) by mouth 4 (four) times a day as needed for cough        Encounter provider Your Video Visit Provider    The patient was identified by name and date of birth. Cele Adrian was informed that this is a telemedicine visit and that the visit is being conducted through the Epic Embedded platform. She agrees to proceed..  My office door was closed. No one else was in the room.  She acknowledged consent and understanding of privacy and security of the video platform. The patient has agreed to participate and understands they can discontinue the visit at any time.    Patient is aware this is a billable service.     History was obtained from: History obtained from: patient  History of Present Illness     This is a 39 year old female here today for video visit.  She started with dry cough 2 days ago.  She has a runny nose.  She denies any fevers at this time.   She is eating an drinking okay.  She has some pain with cough but no sob.  She has noticed some wheezing and has history of asthma.   She states she has used her inhaler which helps.  She did not test for covid but is vaccinated.        Review of Systems   Constitutional:  Negative for activity change, chills, fatigue and fever.   HENT:  Positive for rhinorrhea. Negative for congestion.    Respiratory:  Positive for cough and wheezing. Negative for shortness of breath.    Neurological: Negative.    Psychiatric/Behavioral: Negative.         Objective   There were no vitals taken for this visit.    Physical Exam  Constitutional:        General: She is not in acute distress.     Appearance: Normal appearance. She is not ill-appearing or toxic-appearing.   HENT:      Head: Normocephalic and atraumatic.      Nose: Congestion present.   Pulmonary:      Effort: Pulmonary effort is normal. No respiratory distress.   Neurological:      Mental Status: She is alert and oriented to person, place, and time.   Psychiatric:         Mood and Affect: Mood normal.         Behavior: Behavior normal.         Thought Content: Thought content normal.         Judgment: Judgment normal.         Visit Time  Total Visit Duration:  minutes not including the time spent for establishing the audio/video connection.

## 2025-01-29 ENCOUNTER — OPTICAL OFFICE (OUTPATIENT)
Dept: URBAN - NONMETROPOLITAN AREA CLINIC 4 | Facility: CLINIC | Age: 40
Setting detail: OPHTHALMOLOGY
End: 2025-01-29
Payer: COMMERCIAL

## 2025-01-29 ENCOUNTER — DOCTOR'S OFFICE (OUTPATIENT)
Dept: URBAN - NONMETROPOLITAN AREA CLINIC 1 | Facility: CLINIC | Age: 40
Setting detail: OPHTHALMOLOGY
End: 2025-01-29
Payer: COMMERCIAL

## 2025-01-29 ENCOUNTER — RX ONLY (RX ONLY)
Age: 40
End: 2025-01-29

## 2025-01-29 DIAGNOSIS — H52.13: ICD-10-CM

## 2025-01-29 PROBLEM — Z01.00 GOOD OCULAR HEALTH OU: Status: ACTIVE | Noted: 2025-01-29

## 2025-01-29 PROCEDURE — V2100 LENS SPHER SINGLE PLANO 4.00: HCPCS | Mod: RT | Performed by: OPTOMETRIST

## 2025-01-29 PROCEDURE — V2103 SPHEROCYLINDR 4.00D/12-2.00D: HCPCS | Mod: LT | Performed by: OPTOMETRIST

## 2025-01-29 PROCEDURE — 92015 DETERMINE REFRACTIVE STATE: CPT | Performed by: OPTOMETRIST

## 2025-01-29 PROCEDURE — V2020 VISION SVCS FRAMES PURCHASES: HCPCS | Performed by: OPTOMETRIST

## 2025-01-29 PROCEDURE — 92004 COMPRE OPH EXAM NEW PT 1/>: CPT | Performed by: OPTOMETRIST

## 2025-01-29 PROCEDURE — V2784 LENS POLYCARB OR EQUAL: HCPCS | Performed by: OPTOMETRIST

## 2025-01-29 PROCEDURE — V2784 LENS POLYCARB OR EQUAL: HCPCS | Mod: LT | Performed by: OPTOMETRIST

## 2025-01-29 ASSESSMENT — REFRACTION_MANIFEST
OD_CYLINDER: SPH
OS_AXIS: 060
OD_VA2: 20/25+2
OS_SPHERE: -2.00
OS_VA1: 20/25+2
OS_CYLINDER: -0.50
OS_VA2: 20/25+2
OD_VA1: 20/25+2
OD_SPHERE: -2.50

## 2025-01-29 ASSESSMENT — REFRACTION_AUTOREFRACTION
OD_SPHERE: -3.00
OS_AXIS: 062
OS_CYLINDER: -0.50
OS_SPHERE: -2.50

## 2025-01-29 ASSESSMENT — CONFRONTATIONAL VISUAL FIELD TEST (CVF)
OD_FINDINGS: FULL
OS_FINDINGS: FULL

## 2025-01-29 ASSESSMENT — VISUAL ACUITY
OS_BCVA: 20/150
OD_BCVA: 20/100

## 2025-01-29 ASSESSMENT — TONOMETRY
OD_IOP_MMHG: 16
OS_IOP_MMHG: 16

## 2025-01-30 ENCOUNTER — OPTICAL OFFICE (OUTPATIENT)
Dept: URBAN - NONMETROPOLITAN AREA CLINIC 4 | Facility: CLINIC | Age: 40
Setting detail: OPHTHALMOLOGY
End: 2025-01-30
Payer: COMMERCIAL

## 2025-01-30 DIAGNOSIS — H52.13: ICD-10-CM

## 2025-01-30 PROCEDURE — V2784 LENS POLYCARB OR EQUAL: HCPCS | Mod: LT | Performed by: OPTOMETRIST

## 2025-01-30 PROCEDURE — V2784 LENS POLYCARB OR EQUAL: HCPCS | Performed by: OPTOMETRIST

## 2025-01-30 PROCEDURE — V2103 SPHEROCYLINDR 4.00D/12-2.00D: HCPCS | Mod: LT | Performed by: OPTOMETRIST

## 2025-01-30 PROCEDURE — V2100 LENS SPHER SINGLE PLANO 4.00: HCPCS | Mod: RT | Performed by: OPTOMETRIST

## 2025-01-30 PROCEDURE — V2020 VISION SVCS FRAMES PURCHASES: HCPCS | Performed by: OPTOMETRIST

## 2025-07-30 ENCOUNTER — HOSPITAL ENCOUNTER (OUTPATIENT)
Dept: RADIOLOGY | Facility: CLINIC | Age: 40
Discharge: HOME | End: 2025-07-30
Attending: PHYSICIAN ASSISTANT
Payer: COMMERCIAL

## 2025-07-30 VITALS — BODY MASS INDEX: 42.51 KG/M2 | HEIGHT: 62 IN | WEIGHT: 231 LBS

## 2025-07-30 DIAGNOSIS — R92.8 ABNORMAL MAMMOGRAM: ICD-10-CM

## 2025-07-30 PROCEDURE — 76642 ULTRASOUND BREAST LIMITED: CPT

## 2025-07-30 PROCEDURE — G0279 TOMOSYNTHESIS, MAMMO: HCPCS

## 2025-07-30 PROCEDURE — 77066 DX MAMMO INCL CAD BI: CPT

## 2025-07-31 DIAGNOSIS — Z09 ENCOUNTER FOR FOLLOW-UP EXAMINATION AFTER COMPLETED TREATMENT FOR CONDITIONS OTHER THAN MALIGNANT NEOPLASM: ICD-10-CM

## 2025-07-31 DIAGNOSIS — N64.89 OTHER SPECIFIED DISORDERS OF BREAST: ICD-10-CM

## 2025-07-31 DIAGNOSIS — Z12.31 ENCOUNTER FOR SCREENING MAMMOGRAM FOR MALIGNANT NEOPLASM OF BREAST: ICD-10-CM

## 2025-07-31 DIAGNOSIS — R92.8 OTHER ABNORMAL AND INCONCLUSIVE FINDINGS ON DIAGNOSTIC IMAGING OF BREAST: ICD-10-CM
